# Patient Record
Sex: FEMALE | Race: BLACK OR AFRICAN AMERICAN | NOT HISPANIC OR LATINO | Employment: UNEMPLOYED | URBAN - METROPOLITAN AREA
[De-identification: names, ages, dates, MRNs, and addresses within clinical notes are randomized per-mention and may not be internally consistent; named-entity substitution may affect disease eponyms.]

---

## 2018-01-11 NOTE — PROGRESS NOTES
MAR 30 2016         RE: Vicente Cavanaugh                               To: Negrito SHAWNA Cordova    MR#: [id number]                                    [address]   : [address] [facility],  O  [place] [address]   #: *********                                    Fax: [phone]   ENC:   (Exam #: [id number])      The LMP of this 30year old,  3, para 2 patient was 2015,   giving her an ROMERO of AUG 18 2016 and a current gestational age of 24 weeks   6 days by dates  A sonographic examination was performed on MAR 30 2016   using real time equipment  The ultrasound examination was performed using   abdominal & vaginal techniques  The patient has a BMI of 26 4  Her blood   pressure today was 103/65        Earliest ultrasound found in her record: 2016   9w4d  2016 ROMERO      Cardiac motion was observed at 144 bpm       INDICATIONS      long  interval pregnancy   previous  delivery   fetal anatomical survey      Exam Types      LEVEL II   Transvaginal      RESULTS      Fetus # 1 of 1   Vertex presentation   Fetal growth appeared normal   Placenta Location = Posterior, low lying   No placenta previa   Placenta Grade = I      MEASUREMENTS (* Included In Average GA)      AC              15 9 cm        20 weeks 6 days* (67%)   BPD              5 1 cm        21 weeks 2 days* (86%)   HC              18 0 cm        20 weeks 2 days* (59%)   Femur            3 2 cm        20 weeks 1 day * (46%)      Nuchal Fold      3 9 mm      Humerus          3 2 cm        20 weeks 4 days  (67%)   Radius           2 7 cm        21 weeks 1 day   Ulna             2 9 cm        20 weeks 6 days   Tibia            2 8 cm        20 weeks 2 days  (57%)   Fibula           2 9 cm        19 weeks 6 days   Foot             3 6 cm        21 weeks 1 day      Cerebellum       2 0 cm        20 weeks 1 day   Biorbit          3 1 cm        20 weeks 1 day   CisternaMagna    3 9 mm HC/AC           1 13   FL/AC           0 20   FL/BPD          0 64   EFW (Ac/Fl/Hc)   360 grams - 0 lbs 13 oz      THE AVERAGE GESTATIONAL AGE is 20 weeks 5 days +/- 10 days  AMNIOTIC FLUID      Largest Vertical Pocket = 4 2 cm   Amniotic Fluid: Normal      CERVICAL EVALUATION           Supine               Cervical Length: 4 20 cm        Other Test Results         Resp To T F  Pressure: No                    Funneling?: No              Dynamic Changes?: No      ANATOMY      Head                                    Normal   Face/Neck                               Normal   Th  Cav  Normal   Heart                                   Normal   Abd  Cav  Normal   Stomach                                 Normal   Right Kidney                            Normal   Left Kidney                             Normal   Bladder                                 Normal   Abd  Wall                               Normal   Spine                                   Normal   Extrems                                 Normal   Genitalia                               Normal   Placenta                                Normal   Umbl  Cord                              Normal   Uterus                                  Normal      ANATOMY DETAILS      Visualized Appearing Sonographically Normal:   HEAD: (Calvarium, BPD Level, Lateral Ventricles, Choroid Plexus,   Cerebellum, Cisterna Magna);    FACE/NECK: (Neck, Nuchal Fold, Profile,   Orbits, Nose/Lips, Palate, Face);    TH  CAV : (Diaphragm); HEART:   (Four Chamber View, Proximal Left Outflow, Proximal Right Outflow, Aortic   Arch, Ductal Arch, Short Axis of Greater Vessels, Cardiac Axis,   Interventricular Septum, Interatrial Septum, IVC, SVC, Cardiac Position);      ABD  CAV , STOMACH, RIGHT KIDNEY, LEFT KIDNEY, BLADDER, ABD   WALL,   SPINE: (Cervical Spine, Thoracic Spine, Lumbar Spine, Sacrum);    EXTREMS:   (Lt Humerus, Rt Humerus, Lt Forearm, Rt Forearm, Lt Hand, Rt Hand, Lt   Femur, Rt Femur, Lt Low Leg, Rt Low Leg, Lt Foot, Rt Foot);    GENITALIA,   PLACENTA, UMBL  CORD, UTERUS      ANATOMY COMMENTS      No fetal structural abnormality or ultrasound marker for aneuploidy is   identified on the Level II ultrasound study today  The genitalia were   reviewed and found to be male      The patient is  aware of the results of   the fetal sex  Fetal growth and amniotic fluid volume appear normal     Active movement of the fetal body & extremities was seen  There is no   suspicion of a subchorionic bleed  The placental cord insertion was   normal    The cervix seen by transvaginal scan appears normal measuring   4 2 cms  There is no evidence for spontaneous funneling of the cervix seen  ADNEXA      The left ovary appeared normal and measured 2 8 x 2 5 x 2 0 cm with a   volume of 7 3 cc  The right ovary appeared normal and measured 2 6 x 2 6 x   2 6 cm with a volume of 9 2 cc  IMPRESSION      Berrios IUP   20 weeks and 5 days by this ultrasound  (ROMERO=AUG 12 2016)   Vertex presentation   Fetal growth appeared normal   Normal anatomy survey   Regular fetal heart rate of 144 bpm   Posterior, low lying placenta   No placenta previa      GENERAL COMMENT         I had the pleasure of seeing Marco Barbara in the  Center for   her level II ultrasound  She has a history of a prior  delivery  There was also been a long interpregnancy interval  She did have an   elevated inhibin value on her sequential screen but the other for hormones   were normal  She denies any pregnancy complications  Today's ultrasound was reassuring  A viable fetus was seen  The placenta   is posterior and low-lying as it does come to within 1 2 cm of the   internal os  Amniotic fluid appeared normal  Fetal growth appeared very   appropriate and correlated well with her due date  There were no obvious   anomalies seen today   The anatomic survey was completed today  There were   no Down syndrome markers seen  Both maternal ovaries were seen and   appeared normal  There were no obvious subchorionic hematomas or uterine   myomas  The uterine artery Doppler flow studies were normal  Her cervix   was seen transvaginally and appeared normal in length with no evidence of   funneling or dynamic change  The placental cord insertion were seen and   was normal in location  The patient appeared well-nourished, well-developed, and in no apparent   distress  Her uterus is nontender  Her abdomen was gravid and nontender  The anatomic survey was completed today and there were no obvious anatomic   abnormalities  We discussed kick counts in the office today  We discussed the 10 kicks in   2 hour rule  I asked her to call your office if criteria are not met  She   should continue to do her kick counts on a daily basis  Kick counts should   begin at 28 weeks  MISSED ANATOMY: None      NEW FINDINGS ON TODAY'S ULTRASOUND: Posterior low-lying placenta  IN SUMMARY: Today's ultrasound was very reassuring for Emerald  The fetus   appears to be growing well as today's ultrasound correlated well with her   due date  There were no markers for Down syndrome  There were no fetal   anomalies and the anatomic survey was completed  Her cervix was long and   closed  The amniotic fluid also appeared normal  Thus today's ultrasound   was reassuring  Given the elevated inhibin value and her posterior   low-lying placenta, I did recommend to followup growth scans, one at 28   weeks, and one at 34 weeks  Overall today's ultrasound was still very   reassuring  Total face-to-face time with the patient, excluding ultrasound time was 15   minutes with more than 50% of the time devoted to counseling and   coordination of care  Thank you very much for allowing me to participate in the care of your   patient   If you have any questions or concerns about today's visit, please   do not hesitate to call me  Sincerely,      SHAWNA Galvez  Maternal Fetal Medicine      TWYLA Vazquez M D     Maternal-Fetal Medicine   Electronically signed 03/30/16 11:50

## 2018-01-13 NOTE — PROGRESS NOTES
2016         RE: Rafal Falk                               To: SHAWNA Rachel    MR#: [id number]                                   [address]   : [place]  [address]   #: *********                                    Fax: [facility]: [phone]   (Exam #: [id number])      The LMP of this 30year old,  3, para 2 patient was 2015,   giving her an ROMERO of AUG 18 2016 and a current gestational age of 11 weeks   6 days by dates  A sonographic examination was performed on 2016   using real time equipment  The ultrasound examination was performed using   abdominal technique  The patient has a BMI of 24 6  Her blood pressure   today was 111/73  Earliest ultrasound found in her record: 2016   9w4d  2016 ROMERO      Thank you very much for your kind referral of Rafal Falk to the   [facility]  at [facility] on 2016 for first   trimester ultrasound evaluation and  assessment  Fredy Rondon is a   13-year-old black female  3 para 5 who is currently at 8-6/7   weeks gestation by an estimated today 2016  With the exception   of occasional nausea, treated with Zofran, she has no complaints  She   denies vaginal bleeding so far  Her early prenatal course has been   unremarkable      Emerald has a past OB history significant for an initial pregnancy   delivered at term vaginally in  following an uncomplicated prenatal   course  She delivered a 7 lbs  6 oz  baby girl  By history, her only other   prior pregnancy was delivered at about 36 weeks gestation in    following spontaneous  labor at [facility]  Her baby daughter   had a birthweight of 4 pounds 6 ounces, and had a several week    intensive care unit stay  Each of her children is currently healthy        Emerald has a history of depression, currently not treated medically  Her   past medical and surgical histories are otherwise unremarkable  She takes   no medication with the exception of a prenatal vitamin on a daily basis   and has no known drug allergy  She denies tobacco, alcohol, or illicit   drug use during the pregnancy  Her sister has diabetes  Her mom has   hypertension  The family medical history is otherwise negative with   respect to first degree relatives with diabetes, hypertension, or venous   thromboembolism  There is no family history of sickle cell disease  The   family genetic history is negative with respect to genetic abnormalities,   birth defects, or mental retardation  Multiple longitudinal and transverse sections revealed a rivas   intrauterine pregnancy  A normal gestational sac was documented  A normal fetal pole was   visualized  Cardiac motion was observed at 175 bpm  The yolk sac was seen,   measuring 0 25 cm  INDICATIONS      long  interval pregnancy   previous  delivery   pregnancy dating      Exam Types      Level I      RESULTS      Fetus # 1 of 1   Fetal growth appeared normal      MEASUREMENTS (* Included In Average GA)      CRL              2 9 cm        9 weeks 4 days *      THE AVERAGE GESTATIONAL AGE is 9 weeks 4 days +/- 5 days  ANATOMY COMMENTS      Anatomic detail is extremely limited at this gestational age  However, a   discrete fetal pole with cardiac and fetal body motion was documented  Limb buds were documented as well  The yolk sac was clearly seen, and the   gestational sac is normal in appearance and located in the fundus of the   uterus  No gross abnormalities were noted on this examination  Free fluid   is not seen in the posterior cul-de-sac  There is no suspicion of a   subchorionic bleed  ADNEXA      The left ovary appeared normal and measured 2 4 x 2 1 x 2 0 cm with a   volume of 5 3 cc   The right ovary appeared normal and measured 2 0 x 1 5 x   1 5 cm with a volume of 2 4 cc  IMPRESSION      Berrios IUP   9 weeks and 4 days by this ultrasound  (ROMERO=AUG 13 2016)   Fetal growth appeared normal   Regular fetal heart rate of 175 bpm      GENERAL COMMENT      Today's ultrasound findings and suggested follow-up were discussed in   detail with Emerald  [facility] will [facility]  at [facility] in about 4 weeks for follow up MFM evaluation, including   the first trimester component of the Stepwise Sequential Screen, which was   discussed at the visit today  Level II ultrasound evaluation will be   performed at about 20 weeks gestation  Serial fetal growth evaluations are   recommended during the second half of the pregnancy given her history of a   prior growth restricted baby  I recommended treatment with weekly IM 17-OHPC, 250 mg, beginning at 16   weeks gestation, which will decrease her risk for recurrence of   spontaneous  birth by about 30%  Medical records should be obtained   from the [facility] in order to verify the events surrounding her    delivery  Continuation of weekly 17-OHPC is recommended through 36   completed weeks gestation  Cervical sonography will be performed at the   level II ultrasound study  Consideration should also be given for   treatment with 81 mg of aspirin a day beginning at 12 weeks gestation,   which will significantly reduce Emerald's chances for recurrence of fetal   growth restriction  Her long interpregnancy interval is associated with an   increased risk for adverse pregnancy outcomes, including preeclampsia,    section, and fetal growth abnormalities  Clinical awareness   should be maintained in this regard  The face to face time, in addition to time spent discussing ultrasound   results, was 15  minutes, greater than 50% of which was spent during   counseling and coordination of care  TWYLA Jara M D     Maternal-Fetal Medicine Electronically signed 01/13/16 14:34

## 2018-01-15 NOTE — PROGRESS NOTES
MAY 25 2016         RE: Ania Osman                               To: SHAWNA Mcduffie    MR#: [id number]                                   [address]   : [address] [facility]  [address]   SS#: *********                                    Fax: [employer]: [phone]   (Exam #: [id number])      The LMP of this 30year old,  G3, P1-1-0-2 patient was 2015, giving   her an ROMERO of AUG 18 2016 and a current gestational age of 32 weeks 6 days   by dates  A sonographic examination was performed on MAY 25 2016 using   real time equipment  The ultrasound examination was performed using   abdominal & vaginal techniques  The patient has a BMI of 27 3  Her blood   pressure today was 107/66  Earliest ultrasound found in her record: 2016   9w4d  2016 ROMERO      Cardiac motion was observed at 153 bpm       INDICATIONS      long  interval pregnancy   previous  delivery   Interval growth assesment   placental location      Exam Types      Level I   Transvaginal      RESULTS      Fetus # 1 of 1   Vertex presentation   Fetal growth appeared normal   Placenta Location = Posterior   No placenta previa   Placenta Grade = II      AMNIOTIC FLUID      Q1: 4 5      Q2: 2 6      Q3: 4 8      Q4: 4 2   DERICK Total = 16 1 cm   Amniotic Fluid: Normal      MEASUREMENTS (* Included In Average GA)      AC              24 0 cm        28 weeks 2 days* (52%)   BPD              7 3 cm        29 weeks 2 days* (67%)   HC              26 8 cm        28 weeks 6 days* (56%)   Femur            5 1 cm        27 weeks 3 days* (30%)      Cerebellum       3 3 cm        29 weeks 4 days      HC/AC           1 11   FL/AC           0 21   FL/BPD          0 70   EFW (Ac/Fl/Hc)  1179 grams - 2 lbs 10 oz                 (50%)      THE AVERAGE GESTATIONAL AGE is 28 weeks 4 days +/- 18 days        CERVICAL EVALUATION      The cervix appeared normal (Ultrasound Examination)  SUPINE      Cervical Length: 4 10 cm      OTHER TEST RESULTS           Funneling?: No             Dynamic Changes?: No        Resp  To TFP?: No      ANATOMY      Head                                    Normal   Th  Cav  Normal   Heart                                   See Details   Abd  Cav  Normal   Stomach                                 Normal   Right Kidney                            Normal   Left Kidney                             Normal   Bladder                                 Normal   Placenta                                Normal      ANATOMY COMMENTS      Fetal anatomy has been previously documented; no anomalies were   identified  No fetal structural abnormality is identified on the Level I   survey today  Follow up anatomy of the lateral ventricles, 4 chamber view,   outflow tracts, diaphragm,  kidneys, stomach and bladder appear normal    Fetal interval growth and amniotic fluid volume are normal       IMPRESSION      Berrios IUP   28 weeks and 4 days by this ultrasound  (ROMERO=AUG 13 2016)   Vertex presentation   Fetal growth appeared normal   Regular fetal heart rate of 153 bpm   Posterior placenta   No placenta previa      GENERAL COMMENT         I had the pleasure of seeing  Marco Goodell in the [facility]    today for followup growth scan  She reports normal daily fetal movements  She denies any vaginal bleeding, leakage of fluid, or significant   contractions or pelvic pressure  There has been no major pregnancy   complications since her last visit here in the  Center  She has a   history of a 36 week spontaneous  birth along with a prior growth   restricted fetus  She did have an elevated inhibin on her sequential   screen and has a long 8 year intra-pregnancy interval          Also on her prior ultrasound the placenta was still close to the internal   os   Today the placenta is at least 2 9 cm away from the internal os and   thus she is cleared for a vaginal delivery  On today's ultrasound, the fetus was in a vertex presentation  The   amniotic fluid appeared very normal today  Fetal growth was within normal   range  The interval anatomy seen today showed no obvious anomalies  Her   cervix today was long and closed as it measured 41 mm with no funneling or   dynamic change  We discussed the importance of initiating fetal kick counting at least   once daily  We discussed the "10 kicks in 2 hour rule"  I instructed her   to report to you immediately should criteria not be met  Kick counts   should begin at 28 weeks gestation  IMPORTANT  FINDINGS ON TODAY'S ULTRASOUND: No longer a low-lying placenta  The fetus is growing well with normal fluid in the vertex presentation         IN SUMMARY:  Today's ultrasound was reassuring as the fetus is growing   well with normal amniotic fluid  The fetus was in a vertex presentation  The placenta is at least 2 9 cm away from her internal os and thus she is   cleared to deliver vaginally  She should continue to do her kick counts on   a daily basis  A fetal growth evaluation is recommended in 6 weeks and   this was scheduled today  Face-to-face time, in addition to time spent discussing ultrasound results   was 10 minutes, with greater than 50% of the time used for counseling and   coordination of care  A description of the counseling/coordination of care   is described above  Thank you very much for allowing us to participate in the care of your   patient  If you have any questions or concerns about today's visit please   do not hesitate to call me  Thank you very much  Amada ROSENTHAL  Maternal Fetal Medicine      Burnie Mcburney, R D M S Alois Slack, M D     Maternal-Fetal Medicine   Electronically signed 05/25/16 10:53

## 2018-01-15 NOTE — PROGRESS NOTES
AUG 10 2016         RE: Kimberly Fierro                               To: Scarlet Canales, M D    MR#: [id number]                                   [address]   : [address] [facility],  [address]   #: *********                                    Fax: [address] [place], [place] [address]vard: [phone]   (Exam #: [id number])      The LMP of this 27year old,  G3, P1-1-0-2 patient was 2015, giving   her an ROMERO of AUG 18 2016 and a current gestational age of 37 weeks 6 days   by dates  A sonographic examination was performed on AUG 10 2016 using   real time equipment  The ultrasound examination was performed using   abdominal technique  The patient has a BMI of 28 5  Her blood pressure   today was 135/81  Earliest ultrasound found in her record: 2016   9w4d  2016 ROMERO      Problem list   1  Advanced maternal age with normal cell free DNA screening   2  New-onset polyhydramnios found at 37 weeks   3  History of a 36 week  delivery      Cardiac motion was observed at 126 bpm       INDICATIONS      polyhydramnios      Exam Types      Biophysical Profile   amniotic fluid evaluation      RESULTS      Fetus # 1 of 1   Vertex presentation   Placenta Location = Right lateral   No placenta previa   Placenta Grade = III      AMNIOTIC FLUID      Q1: 5 0      Q2: 8 2      Q3: 8 3      Q4: 5 5   DERICK Total = 27 0 cm   Amniotic Fluid: POLYHYDRAMNIOS      BIOPHYSICAL PROFILE      The Biophysical Profile score was 8/8  Breathin  Movement: 2  Tone: 2  AFV: 2      IMPRESSION      Berrios IUP   Vertex presentation   Regular fetal heart rate of 126 bpm   Polyhydramnios   Right lateral placenta   No placenta previa      GENERAL COMMENT         Follow-up fetal surveillance includes once weekly BPP's along with daily   kick counts till delivery  TWYLA Gaviria M D     Maternal-Fetal Medicine   Electronically signed 08/10/16 18:40

## 2018-01-15 NOTE — PROGRESS NOTES
FEB 10 2016         RE: Favian Su                               To: SHAWNA Evans    MR#: [id number]                                   [address]   : LON [place]rickson  [address]   SS#: *********                                    Fax: [phone]   ENC:   (Exam #: [id number])      The LMP of this 30year old,  3, para 2 patient was 2015,   giving her an ROMERO of AUG 18 2016 and a current gestational age of 16 weeks   6 days by dates  A sonographic examination was performed on FEB 10 2016   using real time equipment  The patient has a BMI of 24 3  Her blood   pressure today was 105/68  Earliest ultrasound found in her record: 2016   9w4d  2016 ROMERO   Multiple longitudinal and transverse sections revealed a rivas   intrauterine pregnancy with the fetus in variable presentation  The   placenta is posterior in implantation, grade 0 in appearance  Cardiac motion was observed at 150 bpm       INDICATIONS      long  interval pregnancy   previous  delivery   first trimester genetic screening      Exam Types      Level I   sequential screen      RESULTS      Fetus # 1 of 1   Variable presentation   Fetal growth appeared normal      MEASUREMENTS (* Included In Average GA)      CRL              7 0 cm        13 weeks 0 days*   Nuchal Trans    1 80 mm      THE AVERAGE GESTATIONAL AGE is 13 weeks 0 days +/- 7 days  UTERINE ARTERIES                                  S/D   PI    RI    NOTCH       Left Uterine Artery        2 26  0 92  0 56       Right Uterine Artery             1 14      ANATOMY COMMENTS      Anatomic detail is limited at this gestational age  The yolk sac was not   noted  The fetal cranium appeared normal in shape and the nuchal   translucency was normal in size (1 8mm)  The nasal bone appears to be   present  The intracranial anatomy was unremarkable    Evaluation of the   spine revealed no obvious evidence for a neural tube defect  Anatomy of   the fetal thorax appeared within normal limits  The cardiac rhythm was   regular  Within the abdomen, stomach & bladder were visualized and the   abdominal wall appeared intact  A three vessel cord appears to be present  Active movement of the fetal body & extremities was seen  There is no   suspicion of a subchorionic bleed  The placental cord insertion was   normal    The uterine artery Dopplers are normal for this gestational age  There is no suspicion of a uterine myoma  Free fluid is not seen in the   posterior cul-de-sac  ADNEXA      The left ovary appeared normal and measured 2 7 x 2 4 x 2 6 cm with a   volume of 8 8 cc  The right ovary was not visualized  AMNIOTIC FLUID      Largest Vertical Pocket = 5 3 cm   Amniotic Fluid: Normal      IMPRESSION      Berrios IUP   13 weeks and 0 days by this ultrasound  (ROMERO=AUG 17 2016)   Variable presentation   Fetal growth appeared normal   Regular fetal heart rate of 150 bpm   Posterior placenta      GENERAL COMMENT      We discussed the options for genetic screening, including but not limited   to first trimester screening, second trimester screening, combined first   and second trimester screening, noninvasive prenatal testing (NIPT) for   patients at high risk and diagnostic screening through the use of CVS and   amniocentesis  We discussed the risks and benefits of each approach   including the sensitivities and false positive rates as well as the   difference between a screening test and a diagnostic test   At the   conclusion of our discussion the patient elected Stepwise Sequential   Screening to delineate her risk for fetal aneuploidy  She was given a   requisition to go to [facility] to have the first trimester bloodwood drawn      The first trimester portion of the screening results, encompassing age,   nuchal translucency, and biochemistry should be available within one week   of testing and will be reported from [facility]   The second stage of   sequential screening should be completed between the 15th and 21st week of   pregnancy (ideally between 16-18 weeks)  Recommend an anatomy ultrasound be scheduled for 20 weeks gestation  Please note, in addition to the time spent discussing the results of the   ultrasound, I spent approximately 10 minutes of face-to-face time with the   patient, greater than 50% of which was spent in counseling and the   coordination of care for this patient  Thank you very much for allowing us to participate in the care of this   very nice patient  Should you have any questions, please do not hesitate   to contact our office  TWYLA Macias M D     Electronically signed 02/10/16 11:42

## 2018-01-15 NOTE — RESULT NOTES
Message   Please call patient with reassuring results  Thank you  Verified Results  (Q) STEPWISE, PART 1 [id number] 11:40AM Sanford Echavarria     Test Name Result Flag Reference   INTERPRETATION SEE NOTE     This patient's risk does not exceed the first trimester  cut-off for Down syndrome or trisomy 18  The integrated  screen calculation is awaiting the second trimester sample  NT WAS USED IN THE RISK CALCULATIONS  Thank you for submitting this patient's Part 1 specimen  These first trimester values will be incorporated with the  second trimester values as part of the integrated testing  process  Please submit the Part 2 specimen between   02/24/2016-04/19/2016 (15 0 and 22 9 weeks gestation) with   02/24/2016-03/08/2016 (15 0 - 16 9 weeks gestation) being  optimal  When submitting Part 2, please include the  following Specimen # from Part 1:  [id number]   AGE RISK DOWN SYNDROME 1:670     LEANNE DOWN SYNDROME RISK <1:5000  <1:50   RISK FOR TRISOMY 18 <1:5000  <1:100   CALCULATED GESTATIONAL$AGE 13 0     Crown rump length (CRL) was used to calculate gestational  age  ROMERO, if provided, was not used for gestational age  dating  PARUL-A 1580 ng/mL     PARUL-A MOM 1 25     HCG 72 8 IU/mL     HCG MOM 0 80     NT MOM 1 16     The maternal serum screening results indicate a lower risk  of trisomy 21 in this pregnancy  The nasal bone was assessed  via ultrasound and was present  The combined risk is  therefore likely to be less than the calculated risk  Other  findings later in the pregnancy may change the risk  Nasal bone assessment is best accomplished through a fetal  ultrasound performed between 11 weeks 0 days through 13  weeks 6 days   In assessing the risk for aneuploidy, the  evaluation of the maternal serum markers plus the nuchal  thickness measurement is calculated first  Any potential  change to the patient's risk for aneuploidy depends on the  nuchal thickness, crown-rump length, and the ethnic origin,  and therefore the values generated by the algorithm itself  will not change  Additional information about the assessment  of the fetal nasal bone may be found on the [place] [address] website at  http://www  fetalmedicine com/fmf/training-certification/cert  khdmhvse-au-vwmyw  tence/11-13-week-scan/assessment-of-the-nasal-bone/  Please note that the Sequential Integrated maternal serum  screen for Down syndrome risk assessment was designed by Dr Emiliano Vallejo ([address], et al J Med Screen 2003 v10 p56-104)  to provide a high detection rate and low false positive rate  when a cutoff of 1:50 is used to identify high risk  pregnancies during the first trimester  Use of any other  cutoff for determination of risk in the first trimester will  result in a higher false positive rate for the two-part  screen  All patients whose risk is lower than 1:50 should  have a second sample submitted to complete the screen  This is a screening test, not a diagnostic test      This risk assessment is based on demographic data provided  by the ordering physician  Please notify the laboratory  promptly if any data are incorrect  If you have questions concerning this report: For clinical consultation, call [phone]; For technical questions, call [phone] ext [phone]; For recalculations, fax to [phone]  This test was developed and its analytical performance  characteristics have been determined by [facility]  It has not been  cleared or approved by FDA  This assay has been validated  pursuant to the CLIA regulations and is used for clinical  purposes  For additional information, please refer to  http://education  LayerGl[employer]/faq/FAQ85  (This link is provided for informational/educational  purposes only )   REFERRING PHYSICIAN NAME [address] [place] [address] PHONE [phone]     [address] OGLESBY [id number] DATE OF BIRTH 1990     COLLECTION DATE 02/10/2016     MATERNAL WEIGHT 137 lbs     EST'D DATE OF DELIVERY 08/18/2016     ROMERO DETERMINED BY ULTRASOUND     MOTHER'S ETHNIC ORIGIN      NUMBER OF FETUSES 1     INSULIN-DEPEND DIABETIC NO     REPEAT SPECIMEN NO     HX OF NEURAL TUBE DEFECTS NO     HISTORY OF DOWN SYNDROME NO     DONOR EGG NO     Donor Age: Egg Retrieval NOT GIVEN     ULTRASOUND DATE 02/10/2016     ULTRASONOGRAPHER'S NAME MARLENE GLORIAwest Communications     NTQR ULTRASONOGRAPHER ID# [id number]     NTQR LOCATION ID# NOT GIVEN     NTQR READING PHYS ID# [id number]     F ULTRASONOGRAPHER ID# NOT GIVEN     CROWN RUMP LENGTH 70 mm     NUCHAL TRANSLUCENCY 1 8 mm     Nasal Bone PRESENT     IF TWINS NOT GIVEN     TWIN B CRL NOT GIVEN mm     TWIN B NT NOT GIVEN mm     Twin B Nasal Bone NOT GIVEN       SPECIMEN ID NOTIFICATION MISSING SECOND ID [id number] 11:40AM Maria Luisa Deep     Test Name Result Flag Reference   COMMENT: See Below     Only one form of patient ID was present on one or more   sample(s); two forms of patient ID are required by the   College of [employer] (CAP)

## 2018-01-17 NOTE — PROGRESS NOTES
2016         RE: Romeo Collado                               To: SHAWNA Mullins    MR#: [id number]                                   [address]   : [address], [address]   SS#: *********                                    Fax: Loyd Leachh: [phone]   (Exam #: [id number])      The LMP of this 30year old,  G3, P1-1-0-2 patient was 2015, giving   her an ROMERO of AUG 18 2016 and a current gestational age of 29 weeks 6 days   by dates  A sonographic examination was performed on 2016 using real   time equipment  The ultrasound examination was performed using abdominal   technique  The patient has a BMI of 27 8  Her blood pressure today was   118/75  Earliest ultrasound found in her record: 2016   9w4d  2016 ROMERO      Cardiac motion was observed at 128 bpm       INDICATIONS      long  interval pregnancy   previous  delivery   Interval growth assesment      Exam Types      Level I      RESULTS      Fetus # 1 of 1   Vertex presentation   Placenta Location = Posterior   No placenta previa   Placenta Grade = I      MEASUREMENTS (* Included In Average GA)      AC              29 8 cm        33 weeks 6 days* (48%)   BPD              8 5 cm        34 weeks 1 day * (50%)   HC              31 2 cm        34 weeks 4 days* (45%)   Femur            6 4 cm        33 weeks 0 days* (39%)      Humerus          5 9 cm        34 weeks 0 days  (57%)      Cerebellum       4 3 cm        34 weeks 6 days      HC/AC           1 05   FL/AC           0 22   FL/BPD          0 76   EFW (Ac/Fl/Hc)  2273 grams - 5 lbs 0 oz                 (41%)      THE AVERAGE GESTATIONAL AGE is 33 weeks 6 days +/- 21 days        AMNIOTIC FLUID      Q1: 5 1      Q2: 3 5      Q3: 5 4      Q4: 6 1   DERICK Total = 20 2 cm   Amniotic Fluid: Normal      ANATOMY      Head                                    Normal   Heart Normal   Stomach                                 Normal   Right Kidney                            Normal   Left Kidney                             Normal   Bladder                                 Normal   Genitalia                               Normal   Placenta                                Normal      ANATOMY DETAILS      Visualized Appearing Sonographically Normal:   HEAD: (Calvarium, BPD Level, Cavum, Lateral Ventricles, Cerebellum); HEART: (Four Chamber View, Proximal Left Outflow, Proximal Right Outflow,   3 Vessel Trachea, Cardiac Axis, Cardiac Position);    STOMACH, RIGHT   KIDNEY, LEFT KIDNEY, BLADDER, GENITALIA, PLACENTA      ANATOMY COMMENTS      Fetal anatomy has been previously documented; no anomalies were   identified  No fetal structural abnormality is identified on the Level I   survey today  Follow up anatomy of the lateral ventricles, 4 chamber view,   outflow tracts, diaphragm,  kidneys, stomach and bladder appear normal    Fetal interval growth and amniotic fluid volume are normal       BIOPHYSICAL PROFILE      The Biophysical Profile score was 8/8  Breathin  Movement: 2  Tone: 2  AFV: 2      IMPRESSION      Berrios IUP   33 weeks and 6 days by this ultrasound  (ROMERO=AUG 18 2016)   Vertex presentation   Regular fetal heart rate of 128 bpm   Posterior placenta   No placenta previa      GENERAL COMMENT      I had the [facility] growth [facility]  She reports normal daily fetal movements  She denies any vaginal bleeding, leakage of fluid, or significant   contractions or pelvic pressure  There has been no major pregnancy   complications since her last visit here in the  Center  She has a   history of a prior  birth  There has also been a long   interpregnancy interval       On today's ultrasound, the fetus was in a vertex presentation  The   amniotic fluid appeared very normal today   Fetal growth was within normal   range  Fetal breathing was seen today as well  The interval anatomy   appeared very appropriate with no obvious anomalies seen today  We discussed the importance of initiating fetal kick counting at least   once daily  We discussed the "10 kicks in 2 hour rule"  I instructed her   to report to you immediately should criteria not be met  Kick counts   should begin at 28 weeks gestation  Thus today's ultrasound was reassuring  The fetus is growing well with   normal fluid, and in a vertex presentation  No further ultrasounds appear   indicated at this time  Face-to-face time, in addition to time spent discussing ultrasound results   was 10 minutes, with greater than 50% of the time used for counseling and   coordination of care  Misty Hofmeister, R D M S Rhoderick Bamberger, M D     Maternal-Fetal Medicine   Electronically signed 07/06/16 10:33

## 2018-01-17 NOTE — PROGRESS NOTES
AUG 3 2016         RE: Abhishek Negrete                               To: SHAWNA Mccord    MR#: [id number]                                   [address]   : [place]  [address]   SS#: *********                                    Fax: [phone]: [phone]   (Exam #: [id number])      The LMP of this 27year old,  G3, P1-1-0-2 patient was 2015, giving   her an ROMERO of AUG 18 2016 and a current gestational age of 42 weeks 6 days   by dates  A sonographic examination was performed on AUG 3 2016 using real   time equipment  The ultrasound examination was performed using abdominal   technique  The patient has a BMI of 29 2  Her blood pressure today was   124/82  Earliest ultrasound found in her record: 2016   9w4d  2016 ROMERO      Emerald has no complaints today  She reports regular fetal movements and   denies problems related to hypertension, gestational diabetes,    labor, or vaginal bleeding  Cardiac motion was observed at 130 bpm       INDICATIONS      long  interval pregnancy   previous  delivery   Interval growth assesment      Exam Types      Level I   Biophysical Profile      RESULTS      Fetus # 1 of 1   Vertex presentation   Fetal growth appeared normal   Placenta Location = Posterior   No placenta previa   Placenta Grade = III      MEASUREMENTS (* Included In Average GA)      AC              34 4 cm        38 weeks 4 days* (65%)   BPD              8 3 cm        33 weeks 3 days* (<5%)   HC              31 6 cm        35 weeks 0 days* (<5%)   Femur            6 9 cm        35 weeks 0 days* (14%)      HC/AC           0 92   FL/AC           0 20   FL/BPD          0 83   Ceph Index      0 74   EFW (Ac/Fl/Hc)  3071 grams - 6 lbs 12 oz                 (38%)      THE AVERAGE GESTATIONAL AGE is 35 weeks 4 days +/- 21 days        AMNIOTIC FLUID      Q1: 3 2      Q2: 10 6     Q3: 5 4 Q4: 5 4   DERICK Total = 24 5 cm   Amniotic Fluid: POLYHYDRAMNIOS      ANATOMY DETAILS      Visualized Appearing Sonographically Normal:   HEAD: (Calvarium, BPD Level); HEART: (Four myhomemove, Cardiac Axis);      ABD  CAV , STOMACH, RIGHT KIDNEY, LEFT KIDNEY, BLADDER      BIOPHYSICAL PROFILE      The Biophysical Profile score was 8/8  Breathin  Movement: 2  Tone: 2  AFV: 2      IMPRESSION      Berrios IUP   35 weeks and 4 days by this ultrasound  (ROMERO=SEP 3 2016)   Vertex presentation   Fetal growth appeared normal   Regular fetal heart rate of 130 bpm   Polyhydramnios   Posterior placenta   No placenta previa      GENERAL COMMENT      No fetal structural abnormality is identified on the Level I survey today  Fetal interval growth is normal  Mild polyhydramnios is present  The   biophysical profile is normal       Today's ultrasound findings and suggested follow-up were discussed in   detail with Emerald  Daily third trimester fetal kick counting was   discussed at the visit today  She will return to the [address] [place] [address] in one week for a biophysical profile for the   indication of polyhydramnios  Follow-up [facility] ultrasound evaluation is   recommended as clinically indicated  The face to face time, in addition to time spent discussing ultrasound   results, was 10 minutes, greater than 50% of which was spent during   counseling and coordination of care  TWYLA Shetty M D     Maternal-Fetal Medicine   Electronically signed 16 11:09

## 2018-07-30 ENCOUNTER — TELEPHONE (OUTPATIENT)
Dept: OBGYN CLINIC | Facility: CLINIC | Age: 28
End: 2018-07-30

## 2019-03-25 ENCOUNTER — TELEPHONE (OUTPATIENT)
Dept: OBGYN CLINIC | Facility: CLINIC | Age: 29
End: 2019-03-25

## 2019-03-28 ENCOUNTER — OFFICE VISIT (OUTPATIENT)
Dept: FAMILY MEDICINE CLINIC | Facility: CLINIC | Age: 29
End: 2019-03-28

## 2019-03-28 VITALS
OXYGEN SATURATION: 98 % | HEART RATE: 70 BPM | HEIGHT: 63 IN | RESPIRATION RATE: 18 BRPM | TEMPERATURE: 97.8 F | DIASTOLIC BLOOD PRESSURE: 74 MMHG | BODY MASS INDEX: 24.63 KG/M2 | SYSTOLIC BLOOD PRESSURE: 118 MMHG | WEIGHT: 139 LBS

## 2019-03-28 DIAGNOSIS — Z02.4 ENCOUNTER FOR DRIVER'S LICENSE HISTORY AND PHYSICAL: Primary | ICD-10-CM

## 2019-03-28 PROCEDURE — 99213 OFFICE O/P EST LOW 20 MIN: CPT | Performed by: FAMILY MEDICINE

## 2019-03-28 NOTE — PROGRESS NOTES
Assessment/Plan:    Encounter for 's license history and physical  Cervical ROM WNL, no medical conditions that would limit patient from driving, she denies any history of unexplained syncope, seizures or cardiac events  Form filled out in front of patient and given back  20/25 vision bilaterally uncorrected  Form copied and placed to be scanned and filed to her chart  Diagnoses and all orders for this visit:    Encounter for 's license history and physical          Subjective:      Patient ID: Jimmy Rausch is a 25 y o  female  She presents today for a 's license physical        The following portions of the patient's history were reviewed and updated as appropriate: allergies, current medications, past family history, past medical history, past social history, past surgical history and problem list     Review of Systems   Constitutional: Negative for activity change, appetite change, fatigue and fever  HENT: Negative for drooling and sore throat  Eyes: Negative for pain and visual disturbance  Respiratory: Negative for cough, chest tightness and shortness of breath  Cardiovascular: Negative for chest pain and palpitations  Gastrointestinal: Negative for abdominal pain, constipation, diarrhea and nausea  Genitourinary: Negative for dysuria and hematuria  Musculoskeletal: Negative for back pain and myalgias  Skin: Negative for color change  Neurological: Negative for numbness and headaches  Psychiatric/Behavioral: Negative for hallucinations and suicidal ideas  Objective:      /74 (BP Location: Left arm, Patient Position: Sitting, Cuff Size: Standard)   Pulse 70   Temp 97 8 °F (36 6 °C) (Temporal)   Resp 18   Ht 5' 3" (1 6 m)   Wt 63 kg (139 lb)   SpO2 98%   BMI 24 62 kg/m²          Physical Exam   Constitutional: She is oriented to person, place, and time  She appears well-developed and well-nourished     HENT:   Head: Normocephalic and atraumatic  Right Ear: External ear normal    Left Ear: External ear normal    Eyes: Pupils are equal, round, and reactive to light  Neck: Normal range of motion  Neck supple  Cardiovascular: Normal rate, regular rhythm, normal heart sounds and intact distal pulses  Pulmonary/Chest: Effort normal and breath sounds normal  No respiratory distress  Abdominal: Soft  Bowel sounds are normal  She exhibits no distension  There is no tenderness  Musculoskeletal: Normal range of motion  She exhibits no edema  Lymphadenopathy:     She has no cervical adenopathy  Neurological: She is alert and oriented to person, place, and time  Skin: Skin is warm and dry  Psychiatric: She has a normal mood and affect

## 2019-03-29 ENCOUNTER — OFFICE VISIT (OUTPATIENT)
Dept: OBGYN CLINIC | Facility: CLINIC | Age: 29
End: 2019-03-29

## 2019-03-29 VITALS
DIASTOLIC BLOOD PRESSURE: 100 MMHG | SYSTOLIC BLOOD PRESSURE: 140 MMHG | WEIGHT: 132 LBS | HEIGHT: 63 IN | BODY MASS INDEX: 23.39 KG/M2

## 2019-03-29 DIAGNOSIS — B96.89 BACTERIAL VAGINOSIS: Primary | ICD-10-CM

## 2019-03-29 DIAGNOSIS — N76.0 BACTERIAL VAGINOSIS: Primary | ICD-10-CM

## 2019-03-29 LAB — SL AMB POCT WET MOUNT: POSITIVE

## 2019-03-29 PROCEDURE — 99213 OFFICE O/P EST LOW 20 MIN: CPT | Performed by: FAMILY MEDICINE

## 2019-03-29 PROCEDURE — 87491 CHLMYD TRACH DNA AMP PROBE: CPT | Performed by: OBSTETRICS & GYNECOLOGY

## 2019-03-29 PROCEDURE — 87210 SMEAR WET MOUNT SALINE/INK: CPT | Performed by: FAMILY MEDICINE

## 2019-03-29 PROCEDURE — 87591 N.GONORRHOEAE DNA AMP PROB: CPT | Performed by: OBSTETRICS & GYNECOLOGY

## 2019-03-29 RX ORDER — METRONIDAZOLE 7.5 MG/G
5 GEL VAGINAL DAILY
Qty: 70 G | Refills: 0 | Status: SHIPPED | OUTPATIENT
Start: 2019-03-29 | End: 2019-04-02

## 2019-04-01 LAB
C TRACH DNA SPEC QL NAA+PROBE: NEGATIVE
N GONORRHOEA DNA SPEC QL NAA+PROBE: NEGATIVE

## 2019-04-02 ENCOUNTER — TELEPHONE (OUTPATIENT)
Dept: FAMILY MEDICINE CLINIC | Facility: CLINIC | Age: 29
End: 2019-04-02

## 2019-04-02 DIAGNOSIS — N76.0 BV (BACTERIAL VAGINOSIS): Primary | ICD-10-CM

## 2019-04-02 DIAGNOSIS — B96.89 BV (BACTERIAL VAGINOSIS): Primary | ICD-10-CM

## 2019-04-02 RX ORDER — METRONIDAZOLE 7.5 MG/G
1 GEL VAGINAL
Qty: 70 G | Refills: 0 | Status: SHIPPED | OUTPATIENT
Start: 2019-04-02 | End: 2019-04-07

## 2019-07-15 ENCOUNTER — PATIENT OUTREACH (OUTPATIENT)
Dept: OBGYN CLINIC | Facility: CLINIC | Age: 29
End: 2019-07-15

## 2019-07-15 ENCOUNTER — ANNUAL EXAM (OUTPATIENT)
Dept: OBGYN CLINIC | Facility: CLINIC | Age: 29
End: 2019-07-15

## 2019-07-15 VITALS
HEIGHT: 63 IN | DIASTOLIC BLOOD PRESSURE: 70 MMHG | BODY MASS INDEX: 23.85 KG/M2 | SYSTOLIC BLOOD PRESSURE: 120 MMHG | WEIGHT: 134.6 LBS

## 2019-07-15 DIAGNOSIS — Z11.3 SCREEN FOR STD (SEXUALLY TRANSMITTED DISEASE): ICD-10-CM

## 2019-07-15 DIAGNOSIS — Z01.411 ENCOUNTER FOR GYNECOLOGICAL EXAMINATION WITH ABNORMAL FINDING: Primary | ICD-10-CM

## 2019-07-15 DIAGNOSIS — Z30.09 UNWANTED FERTILITY: ICD-10-CM

## 2019-07-15 DIAGNOSIS — B96.89 BV (BACTERIAL VAGINOSIS): ICD-10-CM

## 2019-07-15 DIAGNOSIS — N76.0 BV (BACTERIAL VAGINOSIS): ICD-10-CM

## 2019-07-15 DIAGNOSIS — Z12.4 SCREENING FOR CERVICAL CANCER: ICD-10-CM

## 2019-07-15 DIAGNOSIS — Z30.09 UNWANTED FERTILITY: Primary | ICD-10-CM

## 2019-07-15 DIAGNOSIS — Z13.31 POSITIVE DEPRESSION SCREENING: ICD-10-CM

## 2019-07-15 DIAGNOSIS — N89.8 VAGINAL DISCHARGE: ICD-10-CM

## 2019-07-15 DIAGNOSIS — Z12.39 SCREENING FOR BREAST CANCER: ICD-10-CM

## 2019-07-15 PROBLEM — Z02.4 ENCOUNTER FOR DRIVER'S LICENSE HISTORY AND PHYSICAL: Status: RESOLVED | Noted: 2019-03-28 | Resolved: 2019-07-15

## 2019-07-15 LAB
SL AMB POCT URINE HCG: NEGATIVE
SL AMB POCT WET MOUNT: POSITIVE

## 2019-07-15 PROCEDURE — 87624 HPV HI-RISK TYP POOLED RSLT: CPT | Performed by: NURSE PRACTITIONER

## 2019-07-15 PROCEDURE — G0145 SCR C/V CYTO,THINLAYER,RESCR: HCPCS | Performed by: PATHOLOGY

## 2019-07-15 PROCEDURE — G0124 SCREEN C/V THIN LAYER BY MD: HCPCS | Performed by: PATHOLOGY

## 2019-07-15 PROCEDURE — 87210 SMEAR WET MOUNT SALINE/INK: CPT | Performed by: NURSE PRACTITIONER

## 2019-07-15 PROCEDURE — 87491 CHLMYD TRACH DNA AMP PROBE: CPT | Performed by: NURSE PRACTITIONER

## 2019-07-15 PROCEDURE — 81025 URINE PREGNANCY TEST: CPT | Performed by: NURSE PRACTITIONER

## 2019-07-15 PROCEDURE — 99395 PREV VISIT EST AGE 18-39: CPT | Performed by: NURSE PRACTITIONER

## 2019-07-15 PROCEDURE — 87591 N.GONORRHOEAE DNA AMP PROB: CPT | Performed by: NURSE PRACTITIONER

## 2019-07-15 RX ORDER — METRONIDAZOLE 7.5 MG/G
1 GEL VAGINAL
Qty: 70 G | Refills: 0 | Status: SHIPPED | OUTPATIENT
Start: 2019-07-15 | End: 2020-06-05

## 2019-07-15 RX ORDER — MEDROXYPROGESTERONE ACETATE 150 MG/ML
150 INJECTION, SUSPENSION INTRAMUSCULAR
Qty: 1 ML | Refills: 3 | Status: SHIPPED | OUTPATIENT
Start: 2019-07-15 | End: 2020-06-05

## 2019-07-15 NOTE — LETTER
2019    To Emerald Denis  : 1990      This letter is to advise you that your recent BLOODWORK results were reviewed by me and are NORMAL  Please contact our office for an appointment if you have any additional concerns      JAY Graham

## 2019-07-15 NOTE — PATIENT INSTRUCTIONS
Cigarette Smoking and Your Health     What are the risks to my health if I smoke tobacco?  Nicotine and other chemicals found in tobacco damage every cell in your body  Even if you are a light smoker, you have an increased risk for cancer, heart disease, and lung disease  If you are pregnant or have diabetes, smoking increases your risk for complications  What are the benefits to my health if I stop smoking? · You decrease respiratory symptoms such as coughing, wheezing, and shortness of breath  · You reduce your risk for cancers of the lung, mouth, throat, kidney, bladder, pancreas, stomach, and cervix  If you already have cancer, you increase the benefits of chemotherapy  You also reduce your risk for cancer returning or a second cancer from developing  · You reduce your risk for heart disease, blood clots, heart attack, and stroke  · You reduce your risk for lung infections, and diseases such as pneumonia, asthma, chronic bronchitis, and emphysema  · Your circulation improves  More oxygen can be delivered to your body  If you have diabetes, you lower your risk for complications, such as kidney, artery, and eye diseases  You also lower your risk for nerve damage  Nerve damage can lead to amputations, poor vision, and blindness  · You improve your body's ability to heal and to fight infections  What are the health benefits to others if I stop smoking? Tobacco is harmful to nonsmokers who breathe in your secondhand smoke  The following are ways the health of others around you may improve when you stop smoking:  · You lower the risks for lung cancer and heart disease in nonsmoking adults  · If you are pregnant, you lower the risk for miscarriage, early delivery, low birth weight, and stillbirth  You also lower your baby's risk for SIDS, obesity, developmental delay, and neurobehavioral problems, such as ADHD       · If you have children, you lower their risk for ear infections, colds, pneumonia, bronchitis, and asthma  How to Stop Smoking     You will improve your health and the health of others around you  if you stop smoking  Your risk for heart and lung disease, cancer, stroke, heart attack, and vision problems will also decrease  You can benefit from quitting no matter how long you have smoked  PREPARE to stop smoking  Nicotine is a highly addictive drug found in cigarettes  Withdrawal symptoms can happen when you stop smoking and make it hard to quit  These include anxiety, depression, irritability, trouble sleeping, and increased appetite  You increase your chances of success if you PREPARE to quit  · Set a quit date  Kell Treviño a date that is within the next 2 weeks  Do not pick a day that you think may be stressful or busy  Write down the day or [place] it on your calender  · Tell friends and family that you plan to quit  Explain that you may have withdrawal symptoms when you try to quit  Ask them to support you  They may be able to encourage you and help reduce your stress to make it easier for you to quit  · Make a list of your reasons for quitting  Put the list somewhere you will see it every day, such as your refrigerator  You can look at the list when you have a craving  · Remove all tobacco and nicotine products from your home, car, and workplace  Also, remove anything else that will tempt you to smoke, such as lighters, matches, or ashtrays  Clean your car, home, and places at work that smell like smoke  The smell of smoke can trigger a craving  · Identify triggers that make you want to smoke  This may include activities, feelings, or people  Also write down 1 way you can deal with each of your triggers  For example, if you want to smoke as soon as you wake up, plan another activity during this time, such as exercise  · Make a plan for how you will quit    Learn about the tools that can help you quit, such as medicine, counseling, or nicotine replacement therapy  Choose at least 2 options to help you quit  Tools to help you stop smoking:     · Counseling  from a trained healthcare provider can provide you with support and skills to quit smoking  The provider will also teach you to manage your withdrawal symptoms and cravings  You may receive counseling from one counselor, in group therapy, or through phone therapy called a quit line  · Nicotine replacement therapy (NRT)  such as nicotine patches, gum, or lozenges may help reduce your nicotine cravings  You may get these without a doctor's order  Do not use e-cigarettes or smokeless tobacco in place of cigarettes or to help you quit  They still contain nicotine  · Prescription medicines  such as nasal sprays or nicotine inhalers may help reduce your withdrawal symptoms  Other medicines may also be used to reduce your urge to smoke  Ask your healthcare provider about these medicines  You may need to start certain medicines 2 weeks before your quit date for them to work well  · Hypnosis  is a practice that helps guide you through thoughts and feelings  Hypnosis may help decrease your cravings and make you more willing to quit  · Acupuncture therapy  uses very thin needles to balance energy channels in the body  This is thought to help decrease cravings and symptoms of nicotine withdrawal      · Support groups  let you talk to others who are trying to quit or have already quit  It may be helpful to speak with others about how they quit  Manage your cravings:     · Avoid situations, people, and places that tempt you to smoke  Go to nonsmoking places, such as libraries or restaurants  Understand what tempts you and try to avoid these things  · Keep your hands busy  Hold things such as a stress ball or pen  · Put candy or toothpicks in your mouth  Keep lollipops, sugarless gum, or toothpicks with you at all times  · Do not have alcohol or caffeine    These drinks may tempt you to smoke  Drink healthy liquids such as water or juice instead  · Reward yourself when you resist your cravings  Rewards will motivate you and help you stay positive  · Do an activity that distracts you from your craving  Examples include going for a walk, exercising, or cleaning  Prevent weight gain after you quit:  You may gain a few pounds after you quit smoking  It is healthier for you to gain a few pounds than to continue to smoke  The following can help you prevent weight gain:    · Eat healthy foods  These include fruits, vegetables, whole-grain breads, low-fat dairy products, beans, lean meats, and fish  Eat healthy snacks, such as low-fat yogurt, if you get hungry between meals  · Drink water before, during, and between meals  This will make your stomach feel full and help prevent you from overeating  Ask your healthcare provider how much liquid to drink each day and which liquids are best for you  · Exercise  Take a walk or do some kind of exercise every day  Ask your healthcare provider what exercise is right for you  This may help reduce your cravings and reduce stress

## 2019-07-15 NOTE — PROGRESS NOTES
Jin Rodrigues is a 25 y o  female who presents today for annual GYN exam   Her last pap smear was performed 2016 and result was NILM  She reports no history of abnormal pap smears in her past   She reports menses as regular  Patient's last menstrual period was 2019 (approximate)  Her contraceptive method is nothing - desires to resume Depoprovera (has been greater than a year since last injection)  Her general medical history has been reviewed and she reports it as follows:    Past Medical History:   Diagnosis Date    Chlamydia 2016    Depression     no meds    Fibromyalgia     no meds     Past Surgical History:   Procedure Laterality Date    NO PAST SURGERIES       OB History        3    Para   3    Term   2       1    AB   0    Living   3       SAB   0    TAB   0    Ectopic   0    Multiple   0    Live Births   3               Social History     Tobacco Use    Smoking status: Current Some Day Smoker     Packs/day: 0 25     Types: Cigarettes    Smokeless tobacco: Current User   Substance Use Topics    Alcohol use: Yes     Frequency: 2-4 times a month     Drinks per session: 1 or 2    Drug use: Yes     Types: Marijuana     Comment: intermittent use     Cancer-related family history includes Cancer in her maternal uncle  There is no history of Breast cancer  Current Outpatient Medications:     medroxyPROGESTERone (DEPO-PROVERA) 150 mg/mL injection, Inject 1 mL (150 mg total) into a muscle every 3 (three) months, Disp: 1 mL, Rfl: 3    Review of Systems:  Review of Systems   Constitutional: Negative  Gastrointestinal: Negative  Genitourinary: Positive for vaginal discharge  Negative for difficulty urinating, dysuria, menstrual problem and pelvic pain  Vaginal odor   Skin: Negative          Physical Exam:  /70   Ht 5' 3" (1 6 m)   Wt 61 1 kg (134 lb 9 6 oz)   LMP 2019 (Approximate)   BMI 23 84 kg/m² Physical Exam   Constitutional: She is oriented to person, place, and time  She appears well-developed and well-nourished  Genitourinary: Uterus normal  There is no lesion on the right labia  There is no lesion on the left labia  Vagina exhibits no lesion and no rugosity  Vaginal discharge found  Right adnexum does not display mass and does not display tenderness  Left adnexum does not display mass and does not display tenderness  Cervix does not exhibit motion tenderness, lesion or pinkness  Uterus is not tender  Neck: Neck supple  No thyromegaly present  Cardiovascular: Normal rate and regular rhythm  Pulmonary/Chest: Effort normal and breath sounds normal  Right breast exhibits no mass, no nipple discharge, no skin change and no tenderness  Left breast exhibits no mass, no nipple discharge, no skin change and no tenderness  Abdominal: Soft  Bowel sounds are normal    Neurological: She is alert and oriented to person, place, and time  Skin: Skin is warm and dry  Point of Care Testing:   -Wet mount: few WBC's, no trichomonads, + clue cells   -KOH mount: neg   -Whiff: POSITIVE   -urine HCG: negative    Assessment/Plan:   1  Abnormal well-woman GYN exam   2  Cervical cancer screening:  Pap smear done with HPV co-testing  3  STD screening:  Orders placed for vaginal GC/CT cultures  Orders placed for serum anti-HIV, anti-HCV, HbsAg, RPR    3  Breast cancer screening:  Normal breast exam    4  Depression Screening: Patient's depression screening was positive with a PHQ-2 score of 3  Their PHQ-9 score was 9  Patient verbalizes desire to start mental health counseling  Referral placed for  consultation  5  Tobacco Cessation Counseling: Tobacco cessation counseling and education was provided  The patient is sincerely urged to quit consumption of tobacco  She is not ready to quit tobacco  The numerous health risks of tobacco consumption were discussed     6  Bacterial Vaginosis: Given Rx Metrogel  7  Unwanted fertility:  Given Rx Depoprovera  Urine pregnancy test negative  Will give injection today or tomorrow  8  Return to office in 3 months for next Depoprovera injection

## 2019-07-16 ENCOUNTER — TRANSCRIBE ORDERS (OUTPATIENT)
Dept: LAB | Facility: CLINIC | Age: 29
End: 2019-07-16

## 2019-07-16 ENCOUNTER — CLINICAL SUPPORT (OUTPATIENT)
Dept: OBGYN CLINIC | Facility: CLINIC | Age: 29
End: 2019-07-16

## 2019-07-16 ENCOUNTER — APPOINTMENT (OUTPATIENT)
Dept: LAB | Facility: CLINIC | Age: 29
End: 2019-07-16
Payer: COMMERCIAL

## 2019-07-16 ENCOUNTER — PATIENT OUTREACH (OUTPATIENT)
Dept: OBGYN CLINIC | Facility: CLINIC | Age: 29
End: 2019-07-16

## 2019-07-16 DIAGNOSIS — Z30.09 UNWANTED FERTILITY: Primary | ICD-10-CM

## 2019-07-16 DIAGNOSIS — Z11.3 SCREEN FOR STD (SEXUALLY TRANSMITTED DISEASE): ICD-10-CM

## 2019-07-16 PROCEDURE — 87340 HEPATITIS B SURFACE AG IA: CPT

## 2019-07-16 PROCEDURE — 96372 THER/PROPH/DIAG INJ SC/IM: CPT

## 2019-07-16 PROCEDURE — 86592 SYPHILIS TEST NON-TREP QUAL: CPT

## 2019-07-16 PROCEDURE — 36415 COLL VENOUS BLD VENIPUNCTURE: CPT

## 2019-07-16 PROCEDURE — 86803 HEPATITIS C AB TEST: CPT

## 2019-07-16 PROCEDURE — 87389 HIV-1 AG W/HIV-1&-2 AB AG IA: CPT

## 2019-07-16 RX ORDER — MEDROXYPROGESTERONE ACETATE 150 MG/ML
150 INJECTION, SUSPENSION INTRAMUSCULAR ONCE
Status: COMPLETED | OUTPATIENT
Start: 2019-07-16 | End: 2019-07-16

## 2019-07-16 RX ADMIN — MEDROXYPROGESTERONE ACETATE 150 MG: 150 INJECTION, SUSPENSION INTRAMUSCULAR at 09:14

## 2019-07-17 LAB
C TRACH DNA SPEC QL NAA+PROBE: NEGATIVE
HBV SURFACE AG SER QL: NORMAL
HCV AB SER QL: NORMAL
HIV 1+2 AB+HIV1 P24 AG SERPL QL IA: NORMAL
HPV HR 12 DNA CVX QL NAA+PROBE: NEGATIVE
HPV16 DNA CVX QL NAA+PROBE: NEGATIVE
HPV18 DNA CVX QL NAA+PROBE: NEGATIVE
N GONORRHOEA DNA SPEC QL NAA+PROBE: NEGATIVE
RPR SER QL: NORMAL

## 2019-07-18 NOTE — PROGRESS NOTES
LINDA Care Manager contacted [facility] ([address]) and scheduled an intake appointment for the pt for 7/22/19 @ 1:30 p m  LINDA will provide this information to the pt and confirm her attendance

## 2019-07-18 NOTE — PROGRESS NOTES
LINDA Care Manager met with pt at her GYN visit regarding [address] and housing  Pt states she is currently living in a room with her children  Pt states she works and is working with the [employer] regarding rental assistance  Pt states she has already applied and discussed [email or web address]  Pt states she has been in treatment before and would like assistance getting back into treatment not only to assist with the housing needs, but also her MH and overall well being  Pt states she has been extremely depressed and anxious lately but that she recognizes that this is because of her housing and overall life situation at this time  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

## 2019-07-18 NOTE — PROGRESS NOTES
LINDA Care Manager contacted pt regarding MH intake  Pt states she is able to attend this appointment and requested the information be emailed to  LINDA did so and will f/u to see if pt attends this appointment

## 2019-07-19 ENCOUNTER — TELEPHONE (OUTPATIENT)
Dept: OBGYN CLINIC | Facility: CLINIC | Age: 29
End: 2019-07-19

## 2019-07-19 LAB
LAB AP GYN PRIMARY INTERPRETATION: ABNORMAL
Lab: ABNORMAL
PATH INTERP SPEC-IMP: ABNORMAL

## 2019-07-19 NOTE — TELEPHONE ENCOUNTER
Patient called back  Advised her of abnormal pap results and negative STD testing  Recommend colposcopy  Patient agrees

## 2019-08-15 ENCOUNTER — PATIENT OUTREACH (OUTPATIENT)
Dept: OBGYN CLINIC | Facility: CLINIC | Age: 29
End: 2019-08-15

## 2019-09-06 ENCOUNTER — PATIENT OUTREACH (OUTPATIENT)
Dept: FAMILY MEDICINE CLINIC | Facility: CLINIC | Age: 29
End: 2019-09-06

## 2019-09-06 ENCOUNTER — DOCUMENTATION (OUTPATIENT)
Dept: FAMILY MEDICINE CLINIC | Facility: CLINIC | Age: 29
End: 2019-09-06

## 2019-09-06 ENCOUNTER — OFFICE VISIT (OUTPATIENT)
Dept: FAMILY MEDICINE CLINIC | Facility: CLINIC | Age: 29
End: 2019-09-06

## 2019-09-06 VITALS
RESPIRATION RATE: 20 BRPM | OXYGEN SATURATION: 98 % | WEIGHT: 128 LBS | HEIGHT: 63 IN | BODY MASS INDEX: 22.68 KG/M2 | DIASTOLIC BLOOD PRESSURE: 80 MMHG | TEMPERATURE: 97.7 F | SYSTOLIC BLOOD PRESSURE: 120 MMHG | HEART RATE: 68 BPM

## 2019-09-06 DIAGNOSIS — Z62.810 HISTORY OF SEXUAL ABUSE IN CHILDHOOD: ICD-10-CM

## 2019-09-06 DIAGNOSIS — F33.1 MODERATE EPISODE OF RECURRENT MAJOR DEPRESSIVE DISORDER (HCC): Primary | ICD-10-CM

## 2019-09-06 DIAGNOSIS — Z59.00 HOMELESS FAMILY: ICD-10-CM

## 2019-09-06 PROCEDURE — 99213 OFFICE O/P EST LOW 20 MIN: CPT | Performed by: FAMILY MEDICINE

## 2019-09-06 PROCEDURE — 3008F BODY MASS INDEX DOCD: CPT | Performed by: FAMILY MEDICINE

## 2019-09-06 PROCEDURE — 3725F SCREEN DEPRESSION PERFORMED: CPT | Performed by: FAMILY MEDICINE

## 2019-09-06 RX ORDER — ESCITALOPRAM OXALATE 10 MG/1
10 TABLET ORAL DAILY
Qty: 30 TABLET | Refills: 2 | Status: SHIPPED | OUTPATIENT
Start: 2019-09-06 | End: 2020-05-28 | Stop reason: SDDI

## 2019-09-06 SDOH — ECONOMIC STABILITY - HOUSING INSECURITY: HOMELESSNESS UNSPECIFIED: Z59.00

## 2019-09-06 NOTE — LETTER
Milderd Saint is our patient here at [facility]  She was evaluated today 9/6/2019  Patient is homeless and is a mother to three children 11,12 and 3 years  I am writing to recommend access to a homeless shelter as she attempts to work and obtain her own accomodation  Please call [facility] at the number above if you need any clarification       America Ibrahim MD  9/6/2019
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
## 2019-09-06 NOTE — ASSESSMENT & PLAN NOTE
Diagnosed years ago, was on medication in the past    Currently under a great deal of stress  Patient's mum currently very ill at [facility]  Currently employed at a staffing agency  Needs assistance with accessing  Community resources  Placed referral to case management  History of suicide attempt as a child, positive suicide ideation but no plan  Believes her children are a good reason to remain alive  RX Lexapro 10 mg daily  F/u in one month
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
## 2019-09-11 ENCOUNTER — PATIENT OUTREACH (OUTPATIENT)
Dept: FAMILY MEDICINE CLINIC | Facility: CLINIC | Age: 29
End: 2019-09-11

## 2019-09-12 NOTE — PROGRESS NOTES
Pt presented to clinic very upset that C&Y had visited her home  LINDA HYDE spoke with pt and reminded pt that she was told at the time of her visit that C&Y would be called due to safety concerns for her children being homeless  Pt states the  wants to take her children away and that our staff "promised they wouldn't take my children"  LINDA HYDE reiterated that no promises were made at time of previous encounter and that LINDA HYDE and staff do not have access to pt's history with C&Y  Pt states she must call C&Y  every night and children must speak with  to state they have a place to sleep  LINDA HYDE encouraged pt to continue working with C&Y  LINDA HYDE also discussed pt's appt with [facility] that was set up for her in July  Pt states she did not attend and did not call them to reschedule  LINDA HYDE reiterated the importance of attending her [address] appt in order to get a letter for [email or web address]  Pt provided with MH resources and encouraged to make an appt as soon as possible

## 2019-09-26 ENCOUNTER — PATIENT OUTREACH (OUTPATIENT)
Dept: FAMILY MEDICINE CLINIC | Facility: CLINIC | Age: 29
End: 2019-09-26

## 2019-10-15 ENCOUNTER — TELEPHONE (OUTPATIENT)
Dept: FAMILY MEDICINE CLINIC | Facility: CLINIC | Age: 29
End: 2019-10-15

## 2019-10-15 NOTE — TELEPHONE ENCOUNTER
I think such urgent matters should be brought to the attention of an attending physician in the clinic  I am on night shift and certainly cant respond to this in time  Please print the most recent H&P for the patient to submit

## 2019-10-15 NOTE — TELEPHONE ENCOUNTER
Patient walked in stating she needs a letter explaining all her health issues and chronic conditions for a voucher on a renting program due to her being homeless at this moment with her children  Patient found an apartment with a [employer] and will need this letter no later than today if possible and states it is urgent  Please advice

## 2019-10-16 ENCOUNTER — PATIENT OUTREACH (OUTPATIENT)
Dept: OBGYN CLINIC | Facility: CLINIC | Age: 29
End: 2019-10-16

## 2019-10-16 NOTE — TELEPHONE ENCOUNTER
Dr Arriaga Peers    Just to close the loop   I am seeing this patient with Dr Luh Wallace will keep you posted

## 2019-10-16 NOTE — TELEPHONE ENCOUNTER
Pt would like to be seen by you if possible this week  Pt needs letter stating pt has chronic bipolar disorder and depression  This message is also regarding the messages below

## 2019-10-17 NOTE — PROGRESS NOTES
LINDA Care Manager consulted to see pt regarding her homelessness and proof of MH treatment  Pt is requesting a letter from LINDA HYDE that shows her diagnosis and active status in OP MH  LINDA explained that this needs to come from the OP [address] provider directly  LINDA offered to advocate with the  from [facility] if needed  Pt stated she did not have the 's name or information with her but would call LINDA to provide this information  Pt states she has an appointment with a new therapist at [facility] on 10/21/19  LINDA encouraged pt to keep this appointment and that she may have to be seen for more than one visit before they would provide her with a letter of treatment  LINDA also encouraged pt to discuss this at her intake as well as with her  from [facility] so that she can meet the deadlines needed for rental assistance  Pt verbalized understanding and states she will be attending this appointment  LINDA provided pt with contact information and will remain available for further consult as needed

## 2019-10-30 ENCOUNTER — PATIENT OUTREACH (OUTPATIENT)
Dept: OBGYN CLINIC | Facility: CLINIC | Age: 29
End: 2019-10-30

## 2019-11-05 ENCOUNTER — APPOINTMENT (EMERGENCY)
Dept: RADIOLOGY | Facility: HOSPITAL | Age: 29
End: 2019-11-05
Payer: COMMERCIAL

## 2019-11-05 ENCOUNTER — HOSPITAL ENCOUNTER (EMERGENCY)
Facility: HOSPITAL | Age: 29
Discharge: HOME | End: 2019-11-05
Attending: EMERGENCY MEDICINE
Payer: COMMERCIAL

## 2019-11-05 VITALS
OXYGEN SATURATION: 98 % | TEMPERATURE: 98.2 F | WEIGHT: 134.04 LBS | BODY MASS INDEX: 23.74 KG/M2 | DIASTOLIC BLOOD PRESSURE: 74 MMHG | RESPIRATION RATE: 18 BRPM | SYSTOLIC BLOOD PRESSURE: 126 MMHG | HEART RATE: 78 BPM

## 2019-11-05 DIAGNOSIS — R07.89 CHEST WALL PAIN: ICD-10-CM

## 2019-11-05 LAB
EXT PREG TEST URINE: NEGATIVE
EXT. CONTROL ED NAV: NORMAL

## 2019-11-05 PROCEDURE — 93005 ELECTROCARDIOGRAM TRACING: CPT

## 2019-11-05 PROCEDURE — 99285 EMERGENCY DEPT VISIT HI MDM: CPT

## 2019-11-05 PROCEDURE — 71046 X-RAY EXAM CHEST 2 VIEWS: CPT

## 2019-11-05 PROCEDURE — 99285 EMERGENCY DEPT VISIT HI MDM: CPT | Performed by: EMERGENCY MEDICINE

## 2019-11-05 PROCEDURE — 96372 THER/PROPH/DIAG INJ SC/IM: CPT

## 2019-11-05 PROCEDURE — 81025 URINE PREGNANCY TEST: CPT | Performed by: EMERGENCY MEDICINE

## 2019-11-05 RX ORDER — KETOROLAC TROMETHAMINE 30 MG/ML
30 INJECTION, SOLUTION INTRAMUSCULAR; INTRAVENOUS ONCE
Status: COMPLETED | OUTPATIENT
Start: 2019-11-05 | End: 2019-11-05

## 2019-11-05 RX ORDER — NAPROXEN 500 MG/1
500 TABLET ORAL 2 TIMES DAILY PRN
Qty: 30 TABLET | Refills: 0 | Status: SHIPPED | OUTPATIENT
Start: 2019-11-05 | End: 2020-06-05

## 2019-11-05 RX ADMIN — KETOROLAC TROMETHAMINE 30 MG: 30 INJECTION, SOLUTION INTRAMUSCULAR; INTRAVENOUS at 20:51

## 2019-11-06 LAB
ATRIAL RATE: 82 BPM
P AXIS: 57 DEGREES
PR INTERVAL: 136 MS
QRS AXIS: 26 DEGREES
QRSD INTERVAL: 90 MS
QT INTERVAL: 388 MS
QTC INTERVAL: 453 MS
T WAVE AXIS: 32 DEGREES
VENTRICULAR RATE: 82 BPM

## 2019-11-06 PROCEDURE — 93010 ELECTROCARDIOGRAM REPORT: CPT | Performed by: INTERNAL MEDICINE

## 2019-11-06 NOTE — ED PROCEDURE NOTE
PROCEDURE  ECG 12 Lead Documentation Only  Date/Time: 11/5/2019 8:24 PM  Performed by: Mariam Cota MD  Authorized by: Mariam Cota MD     Indications / Diagnosis:  Cp  ECG reviewed by me, the ED Provider: yes    Patient location:  ED  Previous ECG:     Comparison to cardiac monitor: Yes    Interpretation:     Interpretation: normal    Rate:     ECG rate:  82    ECG rate assessment: normal    Rhythm:     Rhythm: sinus rhythm    Ectopy:     Ectopy: none    QRS:     QRS axis:  Normal    QRS intervals:  Normal  ST segments:     ST segments:  Normal  T waves:     T waves: normal           Mariam Cota MD  11/05/19 2024

## 2019-11-06 NOTE — ED PROVIDER NOTES
History  Chief Complaint   Patient presents with    Chest Pain     midsternal cp that started last night with SOB  States she feels better holding pressure on affected area     Patient is a 69-year-old female with a history of fibromyalgia who presents with a 1 day history of sternal chest pain that started last night  Patient states pain woke her up out of sleep has been a constant sharp pain that does not radiate  Patient states that placing her hand on her chest makes the pain better  Any attempted movement cough makes it worse  Patient does admit to a nonproductive cough  No fevers sweats or chills  Patient did not taking medication for it  Denies any history of similar pain  States that her normal fibromyalgia pain is in her lower back and her shoulders  Denies any history of diabetes high blood pressure high cholesterol  No recent travel  Does admit to smoking 6-7 cigarettes a day  Prior to Admission Medications   Prescriptions Last Dose Informant Patient Reported? Taking?   escitalopram (LEXAPRO) 10 mg tablet   No No   Sig: Take 1 tablet (10 mg total) by mouth daily for 30 days   medroxyPROGESTERone (DEPO-PROVERA) 150 mg/mL injection   No No   Sig: Inject 1 mL (150 mg total) into a muscle every 3 (three) months   metroNIDAZOLE (METROGEL) 0 75 % vaginal gel   No No   Sig: Insert 1 application into the vagina daily at bedtime   Patient not taking: Reported on 9/6/2019      Facility-Administered Medications: None       Past Medical History:   Diagnosis Date    Chlamydia 2016    Depression     no meds    Fibromyalgia     no meds       Past Surgical History:   Procedure Laterality Date    NO PAST SURGERIES         Family History   Problem Relation Age of Onset    Cancer Maternal Uncle         lung    Breast cancer Neg Hx      I have reviewed and agree with the history as documented      Social History     Tobacco Use    Smoking status: Current Some Day Smoker     Packs/day: 0 25 Types: Cigarettes    Smokeless tobacco: Current User   Substance Use Topics    Alcohol use: Yes     Frequency: 2-4 times a month     Drinks per session: 1 or 2    Drug use: Yes     Types: Marijuana     Comment: intermittent use        Review of Systems   Constitutional: Negative  HENT: Negative  Eyes: Negative  Respiratory: Positive for cough  Cardiovascular: Positive for chest pain  Gastrointestinal: Negative  Negative for abdominal pain  Endocrine: Negative  Genitourinary: Negative  Musculoskeletal: Negative  Skin: Negative  Allergic/Immunologic: Negative  Neurological: Negative  Hematological: Negative  Psychiatric/Behavioral: Negative  All other systems reviewed and are negative  Physical Exam  Physical Exam   Constitutional: She is oriented to person, place, and time  She appears well-developed and well-nourished  HENT:   Head: Normocephalic  Eyes: Pupils are equal, round, and reactive to light  Neck: Normal range of motion  Neck supple  Cardiovascular: Normal rate, regular rhythm, intact distal pulses and normal pulses  Exam reveals no gallop, no S3 and no friction rub  No murmur heard  Pulmonary/Chest: Effort normal and breath sounds normal    Patient with reproducible sternal tenderness palpation  Abdominal: Soft  Bowel sounds are normal    Musculoskeletal: Normal range of motion  Right lower leg: Normal  She exhibits no tenderness and no edema  Left lower leg: Normal  She exhibits no tenderness and no edema  Neurological: She is alert and oriented to person, place, and time  Skin: Skin is warm and dry  Capillary refill takes less than 2 seconds  Psychiatric: Her behavior is normal  Her mood appears anxious  Nursing note and vitals reviewed        Vital Signs  ED Triage Vitals [11/05/19 2001]   Temperature Pulse Respirations Blood Pressure SpO2   98 2 °F (36 8 °C) 78 18 126/74 98 %      Temp Source Heart Rate Source Patient Position - Orthostatic VS BP Location FiO2 (%)   Tympanic Monitor Lying Left arm --      Pain Score       Worst Possible Pain           Vitals:    11/05/19 2001   BP: 126/74   Pulse: 78   Patient Position - Orthostatic VS: Lying         Visual Acuity      ED Medications  Medications   ketorolac (TORADOL) injection 30 mg (30 mg Intramuscular Given 11/5/19 2051)       Diagnostic Studies  Results Reviewed     Procedure Component Value Units Date/Time    POCT pregnancy, urine [[id number]]  (Normal) Resulted:  11/05/19 2051    Lab Status:  Final result Updated:  11/05/19 2051     EXT PREG TEST UR (Ref: Negative) negative     Control valid                 XR chest pa & lateral   ED Interpretation by Gini Mensah MD (11/05 2116)   NAD                 Procedures  Procedures       ED Course  ED Course as of Nov 05 2120   Tue Nov 05, 2019 2027 Make patient aware of normal EKG results  Still waiting on urine for Toradol and x-ray  Patient states LMP was about 2 months ago was on the Depo shot  2118 Went over chest x-ray EKG results with patient again  Feeling improved  Will discharge with NSAIDs follow up with PCP return the ER for any concerns                    PERC Rule for PE      Most Recent Value   PERC Rule for PE   Age >=50  0 Filed at: 11/05/2019 2007   HR >=100  0 Filed at: 11/05/2019 2007   O2 Sat on room air < 95%  0 Filed at: 11/05/2019 2007   History of PE or DVT  0 Filed at: 11/05/2019 2007   Recent trauma or surgery  0 Filed at: 11/05/2019 2007   Hemoptysis  0 Filed at: 11/05/2019 2007   Exogenous estrogen  1 Filed at: 11/05/2019 2007   Unilateral leg swelling  0 Filed at: 11/05/2019 2007   PERC Rule for PE Results  1 Filed at: 11/05/2019 2007                      MDM  Number of Diagnoses or Management Options  Chest wall pain:      Amount and/or Complexity of Data Reviewed  Tests in the radiology section of CPT®: reviewed  Tests in the medicine section of CPT®: ordered and reviewed  Review and summarize past medical records: yes  Independent visualization of images, tracings, or specimens: yes        Disposition  Final diagnoses:   Chest wall pain     Time reflects when diagnosis was documented in both MDM as applicable and the Disposition within this note     Time User Action Codes Description Comment    11/5/2019  9:18 PM Juaquin Humza Add [R07 89] Chest wall pain     11/5/2019  9:19 PM Juaquin Humza Modify [R07 89] Chest wall pain       ED Disposition     ED Disposition Condition Date/Time Comment    Discharge Stable Tue Nov 5, 2019  9:18 PM Emerald Aguirre discharge to home/self care  Follow-up Information     Follow up With Specialties Details [place]   [facility]  [address]  [phone]            Patient's Medications   Discharge Prescriptions    NAPROXEN (NAPROSYN) 500 MG TABLET    Take 1 tablet (500 mg total) by mouth 2 (two) times a day as needed for mild pain for up to 16 days       Start Date: 11/5/2019 End Date: 11/21/2019       Order Dose: 500 mg       Quantity: 30 tablet    Refills: 0     No discharge procedures on file      ED Provider  Electronically Signed by           Pura Robertson MD  11/05/19 4856

## 2019-11-12 ENCOUNTER — PATIENT OUTREACH (OUTPATIENT)
Dept: OBGYN CLINIC | Facility: CLINIC | Age: 29
End: 2019-11-12

## 2019-11-12 NOTE — PROGRESS NOTES
SW Care Manager received return call from pt  Pt states she is unsure of how her  is at the [employer] and that she has gone in and left VM's trying to find out more information  Pt states she has found a place but needs confirmation on the rental assistance  SW also contacted the agency but was told to leave a message on the intake line  SW will continue to assist/support pt and will reach out if SW receives a respond call from [employer]  Contact information provided to pt

## 2020-02-04 ENCOUNTER — TELEPHONE (OUTPATIENT)
Dept: OBGYN CLINIC | Facility: CLINIC | Age: 30
End: 2020-02-04

## 2020-02-04 NOTE — TELEPHONE ENCOUNTER
LM on VM for CB from patient  She still has not coordinated colposcopy as recommended for HSIL pap in 7/2019  Will have our nursing staff attempt to contact her and/or send letter if necessary

## 2020-02-07 ENCOUNTER — TELEPHONE (OUTPATIENT)
Dept: OBGYN CLINIC | Facility: CLINIC | Age: 30
End: 2020-02-07

## 2020-04-29 ENCOUNTER — CLINICAL SUPPORT (OUTPATIENT)
Dept: OBGYN CLINIC | Facility: CLINIC | Age: 30
End: 2020-04-29

## 2020-04-29 VITALS
WEIGHT: 132.4 LBS | SYSTOLIC BLOOD PRESSURE: 135 MMHG | DIASTOLIC BLOOD PRESSURE: 84 MMHG | TEMPERATURE: 98.4 F | BODY MASS INDEX: 23.45 KG/M2

## 2020-04-29 DIAGNOSIS — Z30.42 ENCOUNTER FOR SURVEILLANCE OF INJECTABLE CONTRACEPTIVE: Primary | ICD-10-CM

## 2020-04-29 LAB — SL AMB POCT URINE HCG: NEGATIVE

## 2020-04-29 PROCEDURE — 81025 URINE PREGNANCY TEST: CPT

## 2020-04-29 PROCEDURE — 96372 THER/PROPH/DIAG INJ SC/IM: CPT

## 2020-04-29 PROCEDURE — 99211 OFF/OP EST MAY X REQ PHY/QHP: CPT

## 2020-04-29 RX ORDER — MEDROXYPROGESTERONE ACETATE 150 MG/ML
150 INJECTION, SUSPENSION INTRAMUSCULAR ONCE
Status: COMPLETED | OUTPATIENT
Start: 2020-04-29 | End: 2020-04-29

## 2020-04-29 RX ADMIN — MEDROXYPROGESTERONE ACETATE 150 MG: 150 INJECTION, SUSPENSION INTRAMUSCULAR at 14:28

## 2020-05-19 ENCOUNTER — PROCEDURE VISIT (OUTPATIENT)
Dept: OBGYN CLINIC | Facility: CLINIC | Age: 30
End: 2020-05-19

## 2020-05-19 VITALS
TEMPERATURE: 98.5 F | WEIGHT: 133 LBS | BODY MASS INDEX: 23.56 KG/M2 | SYSTOLIC BLOOD PRESSURE: 119 MMHG | DIASTOLIC BLOOD PRESSURE: 70 MMHG

## 2020-05-19 DIAGNOSIS — R87.613 HIGH GRADE SQUAMOUS INTRAEPITHELIAL LESION (HGSIL) ON CYTOLOGIC SMEAR OF CERVIX: Primary | ICD-10-CM

## 2020-05-19 DIAGNOSIS — R87.613 HGSIL ON CYTOLOGIC SMEAR OF CERVIX: ICD-10-CM

## 2020-05-19 LAB — SL AMB POCT URINE HCG: NEGATIVE

## 2020-05-19 PROCEDURE — 88305 TISSUE EXAM BY PATHOLOGIST: CPT | Performed by: PATHOLOGY

## 2020-05-19 PROCEDURE — 57454 BX/CURETT OF CERVIX W/SCOPE: CPT | Performed by: OBSTETRICS & GYNECOLOGY

## 2020-05-19 PROCEDURE — 4004F PT TOBACCO SCREEN RCVD TLK: CPT | Performed by: OBSTETRICS & GYNECOLOGY

## 2020-05-19 PROCEDURE — 81025 URINE PREGNANCY TEST: CPT | Performed by: OBSTETRICS & GYNECOLOGY

## 2020-05-19 PROCEDURE — 99214 OFFICE O/P EST MOD 30 MIN: CPT | Performed by: OBSTETRICS & GYNECOLOGY

## 2020-05-28 ENCOUNTER — OFFICE VISIT (OUTPATIENT)
Dept: OBGYN CLINIC | Facility: CLINIC | Age: 30
End: 2020-05-28

## 2020-05-28 VITALS
SYSTOLIC BLOOD PRESSURE: 147 MMHG | HEART RATE: 73 BPM | TEMPERATURE: 98.8 F | BODY MASS INDEX: 24.45 KG/M2 | DIASTOLIC BLOOD PRESSURE: 90 MMHG | WEIGHT: 138 LBS

## 2020-05-28 DIAGNOSIS — R87.613 HGSIL ON CYTOLOGIC SMEAR OF CERVIX: Primary | ICD-10-CM

## 2020-05-28 DIAGNOSIS — R03.0 ELEVATED BLOOD PRESSURE READING: ICD-10-CM

## 2020-05-28 PROCEDURE — 3077F SYST BP >= 140 MM HG: CPT | Performed by: OBSTETRICS & GYNECOLOGY

## 2020-05-28 PROCEDURE — 99214 OFFICE O/P EST MOD 30 MIN: CPT | Performed by: OBSTETRICS & GYNECOLOGY

## 2020-05-28 PROCEDURE — 3080F DIAST BP >= 90 MM HG: CPT | Performed by: OBSTETRICS & GYNECOLOGY

## 2020-05-28 PROCEDURE — 4004F PT TOBACCO SCREEN RCVD TLK: CPT | Performed by: OBSTETRICS & GYNECOLOGY

## 2020-06-01 ENCOUNTER — TELEPHONE (OUTPATIENT)
Dept: OBGYN CLINIC | Facility: CLINIC | Age: 30
End: 2020-06-01

## 2020-06-03 ENCOUNTER — TELEMEDICINE (OUTPATIENT)
Dept: FAMILY MEDICINE CLINIC | Facility: CLINIC | Age: 30
End: 2020-06-03

## 2020-06-03 DIAGNOSIS — R03.0 ELEVATED BLOOD PRESSURE READING: Primary | ICD-10-CM

## 2020-06-03 PROBLEM — I10 ESSENTIAL HYPERTENSION: Status: ACTIVE | Noted: 2020-06-03

## 2020-06-03 PROCEDURE — 99213 OFFICE O/P EST LOW 20 MIN: CPT | Performed by: FAMILY MEDICINE

## 2020-06-05 ENCOUNTER — CLINICAL SUPPORT (OUTPATIENT)
Dept: FAMILY MEDICINE CLINIC | Facility: CLINIC | Age: 30
End: 2020-06-05

## 2020-06-05 VITALS — SYSTOLIC BLOOD PRESSURE: 124 MMHG | TEMPERATURE: 97.9 F | DIASTOLIC BLOOD PRESSURE: 78 MMHG

## 2020-06-05 DIAGNOSIS — Z01.411 ENCOUNTER FOR WELL WOMAN EXAM WITH ABNORMAL FINDINGS: ICD-10-CM

## 2020-06-05 DIAGNOSIS — I10 HYPERTENSION, UNSPECIFIED TYPE: Primary | ICD-10-CM

## 2020-06-05 PROCEDURE — U0003 INFECTIOUS AGENT DETECTION BY NUCLEIC ACID (DNA OR RNA); SEVERE ACUTE RESPIRATORY SYNDROME CORONAVIRUS 2 (SARS-COV-2) (CORONAVIRUS DISEASE [COVID-19]), AMPLIFIED PROBE TECHNIQUE, MAKING USE OF HIGH THROUGHPUT TECHNOLOGIES AS DESCRIBED BY CMS-2020-01-R: HCPCS

## 2020-06-06 LAB — SARS-COV-2 RNA SPEC QL NAA+PROBE: NOT DETECTED

## 2020-06-12 ENCOUNTER — ANESTHESIA (OUTPATIENT)
Dept: PERIOP | Facility: HOSPITAL | Age: 30
End: 2020-06-12
Payer: COMMERCIAL

## 2020-06-12 ENCOUNTER — HOSPITAL ENCOUNTER (OUTPATIENT)
Facility: HOSPITAL | Age: 30
Setting detail: OUTPATIENT SURGERY
Discharge: HOME | End: 2020-06-12
Attending: OBSTETRICS & GYNECOLOGY | Admitting: OBSTETRICS & GYNECOLOGY
Payer: COMMERCIAL

## 2020-06-12 ENCOUNTER — ANESTHESIA EVENT (OUTPATIENT)
Dept: PERIOP | Facility: HOSPITAL | Age: 30
End: 2020-06-12
Payer: COMMERCIAL

## 2020-06-12 VITALS
TEMPERATURE: 98.2 F | RESPIRATION RATE: 20 BRPM | WEIGHT: 138 LBS | SYSTOLIC BLOOD PRESSURE: 144 MMHG | DIASTOLIC BLOOD PRESSURE: 98 MMHG | HEIGHT: 63 IN | BODY MASS INDEX: 24.45 KG/M2 | HEART RATE: 68 BPM | OXYGEN SATURATION: 100 %

## 2020-06-12 DIAGNOSIS — Z01.411 ENCOUNTER FOR WELL WOMAN EXAM WITH ABNORMAL FINDINGS: ICD-10-CM

## 2020-06-12 LAB
ABO GROUP BLD: NORMAL
BASOPHILS # BLD AUTO: 0.04 THOUSANDS/ΜL (ref 0–0.1)
BASOPHILS NFR BLD AUTO: 1 % (ref 0–1)
BLD GP AB SCN SERPL QL: NEGATIVE
EOSINOPHIL # BLD AUTO: 0.07 THOUSAND/ΜL (ref 0–0.61)
EOSINOPHIL NFR BLD AUTO: 1 % (ref 0–6)
ERYTHROCYTE [DISTWIDTH] IN BLOOD BY AUTOMATED COUNT: 13 % (ref 11.6–15.1)
EXT PREGNANCY TEST URINE: NEGATIVE
EXT. CONTROL: NORMAL
HCT VFR BLD AUTO: 37 % (ref 34.8–46.1)
HGB BLD-MCNC: 12.3 G/DL (ref 11.5–15.4)
IMM GRANULOCYTES # BLD AUTO: 0.02 THOUSAND/UL (ref 0–0.2)
IMM GRANULOCYTES NFR BLD AUTO: 0 % (ref 0–2)
LYMPHOCYTES # BLD AUTO: 2.45 THOUSANDS/ΜL (ref 0.6–4.47)
LYMPHOCYTES NFR BLD AUTO: 43 % (ref 14–44)
MCH RBC QN AUTO: 33 PG (ref 26.8–34.3)
MCHC RBC AUTO-ENTMCNC: 33.2 G/DL (ref 31.4–37.4)
MCV RBC AUTO: 99 FL (ref 82–98)
MONOCYTES # BLD AUTO: 0.31 THOUSAND/ΜL (ref 0.17–1.22)
MONOCYTES NFR BLD AUTO: 5 % (ref 4–12)
NEUTROPHILS # BLD AUTO: 2.81 THOUSANDS/ΜL (ref 1.85–7.62)
NEUTS SEG NFR BLD AUTO: 50 % (ref 43–75)
NRBC BLD AUTO-RTO: 0 /100 WBCS
PLATELET # BLD AUTO: 253 THOUSANDS/UL (ref 149–390)
PMV BLD AUTO: 9.1 FL (ref 8.9–12.7)
RBC # BLD AUTO: 3.73 MILLION/UL (ref 3.81–5.12)
RH BLD: POSITIVE
SPECIMEN EXPIRATION DATE: NORMAL
WBC # BLD AUTO: 5.7 THOUSAND/UL (ref 4.31–10.16)

## 2020-06-12 PROCEDURE — 81025 URINE PREGNANCY TEST: CPT | Performed by: OBSTETRICS & GYNECOLOGY

## 2020-06-12 PROCEDURE — 86850 RBC ANTIBODY SCREEN: CPT | Performed by: OBSTETRICS & GYNECOLOGY

## 2020-06-12 PROCEDURE — 85025 COMPLETE CBC W/AUTO DIFF WBC: CPT | Performed by: OBSTETRICS & GYNECOLOGY

## 2020-06-12 PROCEDURE — 57522 CONIZATION OF CERVIX: CPT | Performed by: OBSTETRICS & GYNECOLOGY

## 2020-06-12 PROCEDURE — 86900 BLOOD TYPING SEROLOGIC ABO: CPT | Performed by: OBSTETRICS & GYNECOLOGY

## 2020-06-12 PROCEDURE — 88307 TISSUE EXAM BY PATHOLOGIST: CPT | Performed by: PATHOLOGY

## 2020-06-12 PROCEDURE — 86901 BLOOD TYPING SEROLOGIC RH(D): CPT | Performed by: OBSTETRICS & GYNECOLOGY

## 2020-06-12 RX ORDER — HYDROMORPHONE HCL/PF 1 MG/ML
0.5 SYRINGE (ML) INJECTION
Status: DISCONTINUED | OUTPATIENT
Start: 2020-06-12 | End: 2020-06-12 | Stop reason: HOSPADM

## 2020-06-12 RX ORDER — ONDANSETRON 2 MG/ML
INJECTION INTRAMUSCULAR; INTRAVENOUS AS NEEDED
Status: DISCONTINUED | OUTPATIENT
Start: 2020-06-12 | End: 2020-06-12 | Stop reason: SURG

## 2020-06-12 RX ORDER — SODIUM CHLORIDE, SODIUM LACTATE, POTASSIUM CHLORIDE, CALCIUM CHLORIDE 600; 310; 30; 20 MG/100ML; MG/100ML; MG/100ML; MG/100ML
125 INJECTION, SOLUTION INTRAVENOUS CONTINUOUS
Status: DISCONTINUED | OUTPATIENT
Start: 2020-06-12 | End: 2020-06-12 | Stop reason: HOSPADM

## 2020-06-12 RX ORDER — ALBUTEROL SULFATE 2.5 MG/3ML
2.5 SOLUTION RESPIRATORY (INHALATION) ONCE AS NEEDED
Status: DISCONTINUED | OUTPATIENT
Start: 2020-06-12 | End: 2020-06-12 | Stop reason: HOSPADM

## 2020-06-12 RX ORDER — LABETALOL 20 MG/4 ML (5 MG/ML) INTRAVENOUS SYRINGE
5
Status: DISCONTINUED | OUTPATIENT
Start: 2020-06-12 | End: 2020-06-12 | Stop reason: HOSPADM

## 2020-06-12 RX ORDER — LIDOCAINE HYDROCHLORIDE 10 MG/ML
0.5 INJECTION, SOLUTION EPIDURAL; INFILTRATION; INTRACAUDAL; PERINEURAL ONCE AS NEEDED
Status: DISCONTINUED | OUTPATIENT
Start: 2020-06-12 | End: 2020-06-12 | Stop reason: HOSPADM

## 2020-06-12 RX ORDER — DEXAMETHASONE SODIUM PHOSPHATE 10 MG/ML
INJECTION, SOLUTION INTRAMUSCULAR; INTRAVENOUS AS NEEDED
Status: DISCONTINUED | OUTPATIENT
Start: 2020-06-12 | End: 2020-06-12 | Stop reason: SURG

## 2020-06-12 RX ORDER — MIDAZOLAM HYDROCHLORIDE 2 MG/2ML
INJECTION, SOLUTION INTRAMUSCULAR; INTRAVENOUS AS NEEDED
Status: DISCONTINUED | OUTPATIENT
Start: 2020-06-12 | End: 2020-06-12 | Stop reason: SURG

## 2020-06-12 RX ORDER — FENTANYL CITRATE 50 UG/ML
INJECTION, SOLUTION INTRAMUSCULAR; INTRAVENOUS AS NEEDED
Status: DISCONTINUED | OUTPATIENT
Start: 2020-06-12 | End: 2020-06-12 | Stop reason: SURG

## 2020-06-12 RX ORDER — LIDOCAINE HYDROCHLORIDE 10 MG/ML
INJECTION, SOLUTION EPIDURAL; INFILTRATION; INTRACAUDAL; PERINEURAL AS NEEDED
Status: DISCONTINUED | OUTPATIENT
Start: 2020-06-12 | End: 2020-06-12 | Stop reason: SURG

## 2020-06-12 RX ORDER — PROPOFOL 10 MG/ML
INJECTION, EMULSION INTRAVENOUS AS NEEDED
Status: DISCONTINUED | OUTPATIENT
Start: 2020-06-12 | End: 2020-06-12 | Stop reason: SURG

## 2020-06-12 RX ORDER — ONDANSETRON 2 MG/ML
4 INJECTION INTRAMUSCULAR; INTRAVENOUS ONCE AS NEEDED
Status: COMPLETED | OUTPATIENT
Start: 2020-06-12 | End: 2020-06-12

## 2020-06-12 RX ORDER — PROMETHAZINE HYDROCHLORIDE 25 MG/ML
12.5 INJECTION, SOLUTION INTRAMUSCULAR; INTRAVENOUS ONCE AS NEEDED
Status: DISCONTINUED | OUTPATIENT
Start: 2020-06-12 | End: 2020-06-12 | Stop reason: HOSPADM

## 2020-06-12 RX ORDER — MEPERIDINE HYDROCHLORIDE 25 MG/ML
12.5 INJECTION INTRAMUSCULAR; INTRAVENOUS; SUBCUTANEOUS ONCE
Status: DISCONTINUED | OUTPATIENT
Start: 2020-06-12 | End: 2020-06-12 | Stop reason: HOSPADM

## 2020-06-12 RX ORDER — OXYCODONE HYDROCHLORIDE 5 MG/1
10 TABLET ORAL EVERY 4 HOURS PRN
Status: DISCONTINUED | OUTPATIENT
Start: 2020-06-12 | End: 2020-06-12 | Stop reason: HOSPADM

## 2020-06-12 RX ORDER — FENTANYL CITRATE/PF 50 MCG/ML
25 SYRINGE (ML) INJECTION
Status: DISCONTINUED | OUTPATIENT
Start: 2020-06-12 | End: 2020-06-12 | Stop reason: HOSPADM

## 2020-06-12 RX ORDER — KETOROLAC TROMETHAMINE 30 MG/ML
15 INJECTION, SOLUTION INTRAMUSCULAR; INTRAVENOUS EVERY 6 HOURS SCHEDULED
Status: DISCONTINUED | OUTPATIENT
Start: 2020-06-12 | End: 2020-06-12 | Stop reason: HOSPADM

## 2020-06-12 RX ORDER — ACETAMINOPHEN 325 MG/1
975 TABLET ORAL EVERY 6 HOURS SCHEDULED
Status: DISCONTINUED | OUTPATIENT
Start: 2020-06-12 | End: 2020-06-12 | Stop reason: HOSPADM

## 2020-06-12 RX ADMIN — MIDAZOLAM 2 MG: 1 INJECTION INTRAMUSCULAR; INTRAVENOUS at 16:39

## 2020-06-12 RX ADMIN — FENTANYL CITRATE 25 MCG: 50 INJECTION, SOLUTION INTRAMUSCULAR; INTRAVENOUS at 17:54

## 2020-06-12 RX ADMIN — FENTANYL CITRATE 25 MCG: 50 INJECTION, SOLUTION INTRAMUSCULAR; INTRAVENOUS at 18:04

## 2020-06-12 RX ADMIN — SODIUM CHLORIDE, SODIUM LACTATE, POTASSIUM CHLORIDE, AND CALCIUM CHLORIDE 125 ML/HR: .6; .31; .03; .02 INJECTION, SOLUTION INTRAVENOUS at 13:58

## 2020-06-12 RX ADMIN — ONDANSETRON 4 MG: 2 INJECTION INTRAMUSCULAR; INTRAVENOUS at 17:54

## 2020-06-12 RX ADMIN — FENTANYL CITRATE 25 MCG: 50 INJECTION, SOLUTION INTRAMUSCULAR; INTRAVENOUS at 17:59

## 2020-06-12 RX ADMIN — FENTANYL CITRATE 50 MCG: 50 INJECTION, SOLUTION INTRAMUSCULAR; INTRAVENOUS at 17:11

## 2020-06-12 RX ADMIN — ONDANSETRON 4 MG: 2 INJECTION INTRAMUSCULAR; INTRAVENOUS at 17:16

## 2020-06-12 RX ADMIN — LIDOCAINE HYDROCHLORIDE 50 MG: 10 INJECTION, SOLUTION EPIDURAL; INFILTRATION; INTRACAUDAL; PERINEURAL at 17:03

## 2020-06-12 RX ADMIN — SODIUM CHLORIDE, SODIUM LACTATE, POTASSIUM CHLORIDE, AND CALCIUM CHLORIDE: .6; .31; .03; .02 INJECTION, SOLUTION INTRAVENOUS at 16:33

## 2020-06-12 RX ADMIN — FENTANYL CITRATE 50 MCG: 50 INJECTION, SOLUTION INTRAMUSCULAR; INTRAVENOUS at 17:19

## 2020-06-12 RX ADMIN — DEXAMETHASONE SODIUM PHOSPHATE 10 MG: 10 INJECTION, SOLUTION INTRAMUSCULAR; INTRAVENOUS at 17:16

## 2020-06-12 RX ADMIN — PROPOFOL 200 MG: 10 INJECTION, EMULSION INTRAVENOUS at 17:03

## 2020-07-11 ENCOUNTER — HOSPITAL ENCOUNTER (EMERGENCY)
Facility: HOSPITAL | Age: 30
Discharge: HOME | End: 2020-07-11
Attending: EMERGENCY MEDICINE | Admitting: EMERGENCY MEDICINE
Payer: COMMERCIAL

## 2020-07-11 VITALS
RESPIRATION RATE: 16 BRPM | TEMPERATURE: 97.5 F | OXYGEN SATURATION: 99 % | HEART RATE: 93 BPM | WEIGHT: 139.33 LBS | DIASTOLIC BLOOD PRESSURE: 90 MMHG | SYSTOLIC BLOOD PRESSURE: 139 MMHG | BODY MASS INDEX: 24.68 KG/M2

## 2020-07-11 DIAGNOSIS — T24.231A: Primary | ICD-10-CM

## 2020-07-11 PROCEDURE — 99283 EMERGENCY DEPT VISIT LOW MDM: CPT | Performed by: EMERGENCY MEDICINE

## 2020-07-11 PROCEDURE — 99283 EMERGENCY DEPT VISIT LOW MDM: CPT

## 2020-07-11 RX ORDER — GINSENG 100 MG
4 CAPSULE ORAL ONCE
Status: COMPLETED | OUTPATIENT
Start: 2020-07-11 | End: 2020-07-11

## 2020-07-11 RX ADMIN — BACITRACIN ZINC 4 LARGE APPLICATION: 500 OINTMENT TOPICAL at 11:43
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
## 2020-07-20 ENCOUNTER — TELEPHONE (OUTPATIENT)
Dept: OBGYN CLINIC | Facility: CLINIC | Age: 30
End: 2020-07-20

## 2020-07-22 ENCOUNTER — TELEPHONE (OUTPATIENT)
Dept: OBGYN CLINIC | Facility: CLINIC | Age: 30
End: 2020-07-22

## 2020-08-06 ENCOUNTER — TELEPHONE (OUTPATIENT)
Dept: OBGYN CLINIC | Facility: CLINIC | Age: 30
End: 2020-08-06

## 2020-10-09 ENCOUNTER — TELEPHONE (OUTPATIENT)
Dept: OBGYN CLINIC | Facility: CLINIC | Age: 30
End: 2020-10-09

## 2020-10-21 ENCOUNTER — OFFICE VISIT (OUTPATIENT)
Dept: OBGYN CLINIC | Facility: CLINIC | Age: 30
End: 2020-10-21

## 2020-10-21 VITALS
WEIGHT: 134.8 LBS | HEART RATE: 72 BPM | TEMPERATURE: 97.5 F | BODY MASS INDEX: 23.88 KG/M2 | SYSTOLIC BLOOD PRESSURE: 144 MMHG | DIASTOLIC BLOOD PRESSURE: 92 MMHG

## 2020-10-21 DIAGNOSIS — R87.613 HGSIL ON CYTOLOGIC SMEAR OF CERVIX: ICD-10-CM

## 2020-10-21 DIAGNOSIS — Z30.42 ENCOUNTER FOR SURVEILLANCE OF INJECTABLE CONTRACEPTIVE: Primary | ICD-10-CM

## 2020-10-21 DIAGNOSIS — R03.0 ELEVATED BLOOD PRESSURE READING: ICD-10-CM

## 2020-10-21 DIAGNOSIS — Z30.09 GENERAL COUNSELING AND ADVICE ON FEMALE CONTRACEPTION: ICD-10-CM

## 2020-10-21 PROCEDURE — 96372 THER/PROPH/DIAG INJ SC/IM: CPT | Performed by: NURSE PRACTITIONER

## 2020-10-21 PROCEDURE — 99213 OFFICE O/P EST LOW 20 MIN: CPT | Performed by: NURSE PRACTITIONER

## 2020-10-21 PROCEDURE — 4004F PT TOBACCO SCREEN RCVD TLK: CPT | Performed by: NURSE PRACTITIONER

## 2020-10-21 RX ORDER — MEDROXYPROGESTERONE ACETATE 150 MG/ML
150 INJECTION, SUSPENSION INTRAMUSCULAR ONCE
Status: COMPLETED | OUTPATIENT
Start: 2020-10-21 | End: 2020-10-21

## 2020-10-21 RX ORDER — MEDROXYPROGESTERONE ACETATE 150 MG/ML
150 INJECTION, SUSPENSION INTRAMUSCULAR
Qty: 1 ML | Refills: 5 | Status: SHIPPED | OUTPATIENT
Start: 2020-10-21 | End: 2022-03-30 | Stop reason: SDUPTHER

## 2020-10-21 RX ORDER — MEDROXYPROGESTERONE ACETATE 150 MG/ML
150 INJECTION, SUSPENSION INTRAMUSCULAR
Qty: 1 ML | Refills: 5 | Status: SHIPPED | OUTPATIENT
Start: 2020-10-21 | End: 2020-10-21 | Stop reason: SDUPTHER

## 2020-10-21 RX ADMIN — MEDROXYPROGESTERONE ACETATE 150 MG: 150 INJECTION, SUSPENSION INTRAMUSCULAR at 11:55

## 2020-12-07 ENCOUNTER — OFFICE VISIT (OUTPATIENT)
Dept: FAMILY MEDICINE CLINIC | Facility: CLINIC | Age: 30
End: 2020-12-07

## 2020-12-07 VITALS
HEART RATE: 89 BPM | OXYGEN SATURATION: 98 % | HEIGHT: 63 IN | TEMPERATURE: 98.9 F | SYSTOLIC BLOOD PRESSURE: 122 MMHG | BODY MASS INDEX: 24.27 KG/M2 | DIASTOLIC BLOOD PRESSURE: 80 MMHG | WEIGHT: 137 LBS | RESPIRATION RATE: 18 BRPM

## 2020-12-07 DIAGNOSIS — R03.0 ELEVATED BLOOD PRESSURE READING: ICD-10-CM

## 2020-12-07 PROCEDURE — 3079F DIAST BP 80-89 MM HG: CPT | Performed by: INTERNAL MEDICINE

## 2020-12-07 PROCEDURE — 3074F SYST BP LT 130 MM HG: CPT | Performed by: INTERNAL MEDICINE

## 2020-12-07 PROCEDURE — 99213 OFFICE O/P EST LOW 20 MIN: CPT | Performed by: INTERNAL MEDICINE

## 2020-12-07 PROCEDURE — 3008F BODY MASS INDEX DOCD: CPT | Performed by: INTERNAL MEDICINE

## 2020-12-07 PROCEDURE — 4004F PT TOBACCO SCREEN RCVD TLK: CPT | Performed by: INTERNAL MEDICINE

## 2020-12-08 ENCOUNTER — TELEPHONE (OUTPATIENT)
Dept: FAMILY MEDICINE CLINIC | Facility: CLINIC | Age: 30
End: 2020-12-08

## 2020-12-14 ENCOUNTER — TELEPHONE (OUTPATIENT)
Dept: FAMILY MEDICINE CLINIC | Facility: CLINIC | Age: 30
End: 2020-12-14

## 2020-12-14 DIAGNOSIS — Z03.818 ENCOUNTER FOR OBSERVATION FOR SUSPECTED EXPOSURE TO OTHER BIOLOGICAL AGENTS RULED OUT: Primary | ICD-10-CM

## 2021-01-21 ENCOUNTER — TELEPHONE (OUTPATIENT)
Dept: OBGYN CLINIC | Facility: CLINIC | Age: 31
End: 2021-01-21

## 2021-01-21 ENCOUNTER — CLINICAL SUPPORT (OUTPATIENT)
Dept: OBGYN CLINIC | Facility: CLINIC | Age: 31
End: 2021-01-21

## 2021-01-21 VITALS
DIASTOLIC BLOOD PRESSURE: 80 MMHG | BODY MASS INDEX: 24.45 KG/M2 | SYSTOLIC BLOOD PRESSURE: 131 MMHG | HEIGHT: 63 IN | WEIGHT: 138 LBS | HEART RATE: 75 BPM

## 2021-01-21 DIAGNOSIS — Z30.09 UNWANTED FERTILITY: Primary | ICD-10-CM

## 2021-01-21 LAB — SL AMB POCT URINE HCG: NEGATIVE

## 2021-01-21 PROCEDURE — 81025 URINE PREGNANCY TEST: CPT

## 2021-01-21 PROCEDURE — 96372 THER/PROPH/DIAG INJ SC/IM: CPT

## 2021-01-21 PROCEDURE — 3008F BODY MASS INDEX DOCD: CPT | Performed by: INTERNAL MEDICINE

## 2021-01-21 RX ORDER — MEDROXYPROGESTERONE ACETATE 150 MG/ML
150 INJECTION, SUSPENSION INTRAMUSCULAR ONCE
Status: COMPLETED | OUTPATIENT
Start: 2021-01-21 | End: 2021-01-21

## 2021-01-21 RX ADMIN — MEDROXYPROGESTERONE ACETATE 150 MG: 150 INJECTION, SUSPENSION INTRAMUSCULAR at 14:02

## 2021-01-25 ENCOUNTER — TELEPHONE (OUTPATIENT)
Dept: OBGYN CLINIC | Facility: CLINIC | Age: 31
End: 2021-01-25

## 2021-01-25 NOTE — TELEPHONE ENCOUNTER
COVID Pre-Visit Screening     1  Is this a family member screening? No  2  Have you traveled outside of your state in the past 2 weeks? No  3  Do you presently have a fever or flu-like symptoms? No  4  Do you have symptoms of an upper respiratory infection like runny nose, sore throat, or cough? No  5  Are you suffering from new headache that you have not had in the past?  No  6  Do you have/have you experienced any new shortness of breath recently? No  7  Do you have any new diarrhea, nausea or vomiting? No  8  Have you been in contact with anyone who has been sick or diagnosed with COVID-19? No - Pt was tested 1/21/21 and NEGATIVE RESULTS  9  Do you have any new loss of taste or smell? No  10  Are you able to wear a mask without a valve for the entire visit?  Yes

## 2021-04-16 ENCOUNTER — CLINICAL SUPPORT (OUTPATIENT)
Dept: OBGYN CLINIC | Facility: CLINIC | Age: 31
End: 2021-04-16

## 2021-04-16 VITALS
HEART RATE: 89 BPM | SYSTOLIC BLOOD PRESSURE: 120 MMHG | DIASTOLIC BLOOD PRESSURE: 81 MMHG | BODY MASS INDEX: 24.45 KG/M2 | WEIGHT: 138 LBS

## 2021-04-16 DIAGNOSIS — Z30.42 ENCOUNTER FOR SURVEILLANCE OF INJECTABLE CONTRACEPTIVE: Primary | ICD-10-CM

## 2021-04-16 PROCEDURE — 96372 THER/PROPH/DIAG INJ SC/IM: CPT

## 2021-04-16 RX ORDER — MEDROXYPROGESTERONE ACETATE 150 MG/ML
150 INJECTION, SUSPENSION INTRAMUSCULAR ONCE
Status: COMPLETED | OUTPATIENT
Start: 2021-04-16 | End: 2021-04-16

## 2021-04-16 RX ADMIN — MEDROXYPROGESTERONE ACETATE 150 MG: 150 INJECTION, SUSPENSION INTRAMUSCULAR at 11:14

## 2021-04-28 ENCOUNTER — TELEPHONE (OUTPATIENT)
Dept: FAMILY MEDICINE CLINIC | Facility: CLINIC | Age: 31
End: 2021-04-28

## 2021-04-28 NOTE — TELEPHONE ENCOUNTER
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

## 2021-06-10 ENCOUNTER — ANNUAL EXAM (OUTPATIENT)
Dept: OBGYN CLINIC | Facility: CLINIC | Age: 31
End: 2021-06-10

## 2021-06-10 ENCOUNTER — PATIENT OUTREACH (OUTPATIENT)
Dept: OBGYN CLINIC | Facility: CLINIC | Age: 31
End: 2021-06-10

## 2021-06-10 VITALS
DIASTOLIC BLOOD PRESSURE: 82 MMHG | BODY MASS INDEX: 25.51 KG/M2 | SYSTOLIC BLOOD PRESSURE: 120 MMHG | HEART RATE: 87 BPM | WEIGHT: 144 LBS

## 2021-06-10 DIAGNOSIS — Z01.419 ENCOUNTER FOR GYNECOLOGICAL EXAMINATION WITHOUT ABNORMAL FINDING: Primary | ICD-10-CM

## 2021-06-10 DIAGNOSIS — Z12.39 ENCOUNTER FOR BREAST CANCER SCREENING USING NON-MAMMOGRAM MODALITY: ICD-10-CM

## 2021-06-10 DIAGNOSIS — Z12.4 SCREENING FOR CERVICAL CANCER: ICD-10-CM

## 2021-06-10 DIAGNOSIS — Z13.31 POSITIVE DEPRESSION SCREENING: ICD-10-CM

## 2021-06-10 DIAGNOSIS — Z11.3 SCREEN FOR STD (SEXUALLY TRANSMITTED DISEASE): ICD-10-CM

## 2021-06-10 PROBLEM — Z59.00 HOMELESS FAMILY: Status: RESOLVED | Noted: 2019-09-06 | Resolved: 2021-06-10

## 2021-06-10 PROBLEM — Z30.42 ENCOUNTER FOR SURVEILLANCE OF INJECTABLE CONTRACEPTIVE: Status: RESOLVED | Noted: 2020-10-21 | Resolved: 2021-06-10

## 2021-06-10 PROBLEM — Z30.09 GENERAL COUNSELING AND ADVICE ON FEMALE CONTRACEPTION: Status: RESOLVED | Noted: 2020-10-21 | Resolved: 2021-06-10

## 2021-06-10 PROBLEM — F33.1 MODERATE EPISODE OF RECURRENT MAJOR DEPRESSIVE DISORDER (HCC): Status: RESOLVED | Noted: 2019-09-06 | Resolved: 2021-06-10

## 2021-06-10 PROBLEM — Z02.4 ENCOUNTER FOR DRIVER'S LICENSE HISTORY AND PHYSICAL: Status: RESOLVED | Noted: 2019-03-28 | Resolved: 2021-06-10

## 2021-06-10 PROBLEM — R03.0 ELEVATED BLOOD PRESSURE READING: Status: RESOLVED | Noted: 2020-06-03 | Resolved: 2021-06-10

## 2021-06-10 PROBLEM — Z62.810 HISTORY OF SEXUAL ABUSE IN CHILDHOOD: Status: RESOLVED | Noted: 2019-09-06 | Resolved: 2021-06-10

## 2021-06-10 PROBLEM — R87.613 HGSIL ON CYTOLOGIC SMEAR OF CERVIX: Status: RESOLVED | Noted: 2020-05-19 | Resolved: 2021-06-10

## 2021-06-10 PROCEDURE — G0145 SCR C/V CYTO,THINLAYER,RESCR: HCPCS | Performed by: NURSE PRACTITIONER

## 2021-06-10 PROCEDURE — 87624 HPV HI-RISK TYP POOLED RSLT: CPT | Performed by: NURSE PRACTITIONER

## 2021-06-10 PROCEDURE — 4004F PT TOBACCO SCREEN RCVD TLK: CPT | Performed by: NURSE PRACTITIONER

## 2021-06-10 PROCEDURE — 99395 PREV VISIT EST AGE 18-39: CPT | Performed by: NURSE PRACTITIONER

## 2021-06-10 PROCEDURE — 87491 CHLMYD TRACH DNA AMP PROBE: CPT | Performed by: NURSE PRACTITIONER

## 2021-06-10 PROCEDURE — 3725F SCREEN DEPRESSION PERFORMED: CPT | Performed by: NURSE PRACTITIONER

## 2021-06-10 PROCEDURE — 87591 N.GONORRHOEAE DNA AMP PROB: CPT | Performed by: NURSE PRACTITIONER

## 2021-06-10 NOTE — PROGRESS NOTES
ANNUAL GYNECOLOGICAL EXAMINATION    Deepa Bal is a 26 y o  female who presents today for annual GYN exam   Her last pap smear was performed 7/15/2019 and result was HSIL  She had LEEP performed 2020  She reports menses as absent due to [address] contraception  Her general medical history has been reviewed and she reports it as follows:    Past Medical History:   Diagnosis Date    Abnormal Pap smear of cervix      HSIL pap, colpo CIN3; LEEP 2020    Chlamydia 2016    Depression     no meds    Fibromyalgia     no meds     Past Surgical History:   Procedure Laterality Date    KS COLPOSCOPY,CERVIX W/ADJ VAG,W/LOOP BX N/A 2020    Procedure: BIOPSY LEEP CERVIX;  Surgeon: Michelle Dodd MD;  Location: [facility];  Service: Gynecology    WISDOM TOOTH EXTRACTION       OB History        3    Para   3    Term   2       1    AB   0    Living   3       SAB   0    TAB   0    Ectopic   0    Multiple   0    Live Births   3               Social History     Tobacco Use    Smoking status: Current Every Day Smoker     Packs/day: 0 25     Types: Cigarettes    Smokeless tobacco: Current User   Substance Use Topics    Alcohol use: Yes     Frequency: 2-4 times a month     Drinks per session: 1 or 2    Drug use: Yes     Types: Marijuana     Comment: intermittent use     Cancer-related family history includes Cancer in her maternal uncle  There is no history of Breast cancer, Colon cancer, or Ovarian cancer  Current Outpatient Medications   Medication Instructions    medroxyPROGESTERone (DEPO-PROVERA) 150 mg, Intramuscular, Every 3 months       Review of Systems:  Review of Systems   Constitutional: Negative  Gastrointestinal: Negative  Genitourinary: Negative for difficulty urinating, menstrual problem, pelvic pain and vaginal discharge  Skin: Negative          Physical Exam:  /82   Pulse 87   Wt 65 3 kg (144 lb)   BMI 25 51 kg/m²   Physical Exam  Constitutional: General: She is not in acute distress  Appearance: She is well-developed  Genitourinary:      Vulva, vagina and uterus normal       No lesions in the vagina  No cervical motion tenderness or lesion  Uterus is not tender  No right or left adnexal mass present  Right adnexa not tender  Left adnexa not tender  Neck:      Musculoskeletal: Neck supple  Thyroid: No thyromegaly  Cardiovascular:      Rate and Rhythm: Normal rate and regular rhythm  Pulmonary:      Effort: Pulmonary effort is normal    Chest:      Breasts:         Right: No mass, nipple discharge, skin change or tenderness  Left: No mass, nipple discharge, skin change or tenderness  Abdominal:      Palpations: Abdomen is soft  Tenderness: There is no abdominal tenderness  Neurological:      Mental Status: She is alert and oriented to person, place, and time  Skin:     General: Skin is warm and dry  Vitals signs reviewed  Assessment/Plan:   1  Normal well-woman GYN exam   2  Cervical cancer screening:  Normal cervical exam   Pap smear done with HPV co-testing  3  STD screening:  Orders placed for vaginal GC/CT cultures  Orders placed for serum anti-HIV, anti-HCV, HbsAg, RPR    4  Breast cancer screening:  Normal breast exam   Reviewed breast self-awareness  5  Depression Screening: Patient's depression screening was assessed with a PHQ-2 score of 3  Their PHQ-9 score was 14  Patient advised to follow-up with PCP for further management  Referral placed for  consultation  6  BMI Counseling: Body mass index is 25 51 kg/m²  No intervention indicated  7  Tobacco Cessation Counseling: Tobacco cessation counseling and education was provided  The patient is sincerely urged to quit consumption of tobacco  She is not ready to quit tobacco  The numerous health risks of tobacco consumption were discussed   If she decides to quit, there are a number of helpful adjunctive aids, and she can see me to discuss nicotine replacement therapy, chantix, or bupropion anytime in the future  8  Contraception:  Depoprovera injection every 3 months     9  Return to office in 1 year for annual GYN exam

## 2021-06-10 NOTE — LETTER
2021    To Emerald Aguirre  : 1990      This letter is to advise you that your recent PAP SMEAR results were reviewed by me and are NORMAL    We will see you in 1 year for your annual exam     JAY Posadas

## 2021-06-10 NOTE — LETTER
9/15/2021    To Emerald Aguirre  : 1990      This letter is to advise you that your recent BLOODWORK results were reviewed by me and are NORMAL  Please contact our office for an appointment if you have any additional concerns      JAY Foster

## 2021-06-10 NOTE — LETTER
6/15/2021    To Emerald Aguirre  : 1990      This letter is to advise you that your recent CULTURE results were reviewed by me and are NORMAL  Please contact the office for an appointment if you have any additional concerns      JAY Morales

## 2021-06-10 NOTE — PATIENT INSTRUCTIONS
Cigarette Smoking and Your Health     What are the risks to my health if I smoke tobacco?  Nicotine and other chemicals found in tobacco damage every cell in your body  Even if you are a light smoker, you have an increased risk for cancer, heart disease, and lung disease  If you are pregnant or have diabetes, smoking increases your risk for complications  What are the benefits to my health if I stop smoking? · You decrease respiratory symptoms such as coughing, wheezing, and shortness of breath  · You reduce your risk for cancers of the lung, mouth, throat, kidney, bladder, pancreas, stomach, and cervix  If you already have cancer, you increase the benefits of chemotherapy  You also reduce your risk for cancer returning or a second cancer from developing  · You reduce your risk for heart disease, blood clots, heart attack, and stroke  · You reduce your risk for lung infections, and diseases such as pneumonia, asthma, chronic bronchitis, and emphysema  · Your circulation improves  More oxygen can be delivered to your body  If you have diabetes, you lower your risk for complications, such as kidney, artery, and eye diseases  You also lower your risk for nerve damage  Nerve damage can lead to amputations, poor vision, and blindness  · You improve your body's ability to heal and to fight infections  What are the health benefits to others if I stop smoking? Tobacco is harmful to nonsmokers who breathe in your secondhand smoke  The following are ways the health of others around you may improve when you stop smoking:  · You lower the risks for lung cancer and heart disease in nonsmoking adults  · If you are pregnant, you lower the risk for miscarriage, early delivery, low birth weight, and stillbirth  You also lower your baby's risk for SIDS, obesity, developmental delay, and neurobehavioral problems, such as ADHD       · If you have children, you lower their risk for ear infections, colds, pneumonia, bronchitis, and asthma  How to Stop Smoking     You will improve your health and the health of others around you  if you stop smoking  Your risk for heart and lung disease, cancer, stroke, heart attack, and vision problems will also decrease  You can benefit from quitting no matter how long you have smoked  PREPARE to stop smoking  Nicotine is a highly addictive drug found in cigarettes  Withdrawal symptoms can happen when you stop smoking and make it hard to quit  These include anxiety, depression, irritability, trouble sleeping, and increased appetite  You increase your chances of success if you PREPARE to quit  · Set a quit date  Dolores Balloon a date that is within the next 2 weeks  Do not pick a day that you think may be stressful or busy  Write down the day or [place] it on your calender  · Tell friends and family that you plan to quit  Explain that you may have withdrawal symptoms when you try to quit  Ask them to support you  They may be able to encourage you and help reduce your stress to make it easier for you to quit  · Make a list of your reasons for quitting  Put the list somewhere you will see it every day, such as your refrigerator  You can look at the list when you have a craving  · Remove all tobacco and nicotine products from your home, car, and workplace  Also, remove anything else that will tempt you to smoke, such as lighters, matches, or ashtrays  Clean your car, home, and places at work that smell like smoke  The smell of smoke can trigger a craving  · Identify triggers that make you want to smoke  This may include activities, feelings, or people  Also write down 1 way you can deal with each of your triggers  For example, if you want to smoke as soon as you wake up, plan another activity during this time, such as exercise  · Make a plan for how you will quit    Learn about the tools that can help you quit, such as medicine, counseling, or nicotine replacement therapy  Choose at least 2 options to help you quit  Tools to help you stop smoking:     · Counseling  from a trained healthcare provider can provide you with support and skills to quit smoking  The provider will also teach you to manage your withdrawal symptoms and cravings  You may receive counseling from one counselor, in group therapy, or through phone therapy called a quit line  · Nicotine replacement therapy (NRT)  such as nicotine patches, gum, or lozenges may help reduce your nicotine cravings  You may get these without a doctor's order  Do not use e-cigarettes or smokeless tobacco in place of cigarettes or to help you quit  They still contain nicotine  · Prescription medicines  such as nasal sprays or nicotine inhalers may help reduce your withdrawal symptoms  Other medicines may also be used to reduce your urge to smoke  Ask your healthcare provider about these medicines  You may need to start certain medicines 2 weeks before your quit date for them to work well  · Hypnosis  is a practice that helps guide you through thoughts and feelings  Hypnosis may help decrease your cravings and make you more willing to quit  · Acupuncture therapy  uses very thin needles to balance energy channels in the body  This is thought to help decrease cravings and symptoms of nicotine withdrawal      · Support groups  let you talk to others who are trying to quit or have already quit  It may be helpful to speak with others about how they quit  Manage your cravings:     · Avoid situations, people, and places that tempt you to smoke  Go to nonsmoking places, such as libraries or restaurants  Understand what tempts you and try to avoid these things  · Keep your hands busy  Hold things such as a stress ball or pen  · Put candy or toothpicks in your mouth  Keep lollipops, sugarless gum, or toothpicks with you at all times  · Do not have alcohol or caffeine    These drinks may tempt you to smoke  Drink healthy liquids such as water or juice instead  · Reward yourself when you resist your cravings  Rewards will motivate you and help you stay positive  · Do an activity that distracts you from your craving  Examples include going for a walk, exercising, or cleaning  Prevent weight gain after you quit:  You may gain a few pounds after you quit smoking  It is healthier for you to gain a few pounds than to continue to smoke  The following can help you prevent weight gain:    · Eat healthy foods  These include fruits, vegetables, whole-grain breads, low-fat dairy products, beans, lean meats, and fish  Eat healthy snacks, such as low-fat yogurt, if you get hungry between meals  · Drink water before, during, and between meals  This will make your stomach feel full and help prevent you from overeating  Ask your healthcare provider how much liquid to drink each day and which liquids are best for you  · Exercise  Take a walk or do some kind of exercise every day  Ask your healthcare provider what exercise is right for you  This may help reduce your cravings and reduce stress

## 2021-06-11 ENCOUNTER — PATIENT OUTREACH (OUTPATIENT)
Dept: OBGYN CLINIC | Facility: CLINIC | Age: 31
End: 2021-06-11

## 2021-06-15 LAB
C TRACH DNA SPEC QL NAA+PROBE: NEGATIVE
HPV HR 12 DNA CVX QL NAA+PROBE: NEGATIVE
HPV16 DNA CVX QL NAA+PROBE: NEGATIVE
HPV18 DNA CVX QL NAA+PROBE: NEGATIVE
N GONORRHOEA DNA SPEC QL NAA+PROBE: NEGATIVE

## 2021-06-17 LAB
LAB AP GYN PRIMARY INTERPRETATION: NORMAL
Lab: NORMAL
PATH INTERP SPEC-IMP: NORMAL

## 2021-09-13 ENCOUNTER — APPOINTMENT (OUTPATIENT)
Dept: LAB | Facility: HOSPITAL | Age: 31
End: 2021-09-13
Attending: NURSE PRACTITIONER
Payer: COMMERCIAL

## 2021-09-13 DIAGNOSIS — Z11.3 SCREEN FOR STD (SEXUALLY TRANSMITTED DISEASE): ICD-10-CM

## 2021-09-13 LAB
HBV SURFACE AG SER QL: NORMAL
HCV AB SER QL: NORMAL

## 2021-09-13 PROCEDURE — 87389 HIV-1 AG W/HIV-1&-2 AB AG IA: CPT

## 2021-09-13 PROCEDURE — 86592 SYPHILIS TEST NON-TREP QUAL: CPT

## 2021-09-13 PROCEDURE — 87340 HEPATITIS B SURFACE AG IA: CPT

## 2021-09-13 PROCEDURE — 86803 HEPATITIS C AB TEST: CPT

## 2021-09-13 PROCEDURE — 36415 COLL VENOUS BLD VENIPUNCTURE: CPT

## 2021-09-14 ENCOUNTER — TELEPHONE (OUTPATIENT)
Dept: FAMILY MEDICINE CLINIC | Facility: CLINIC | Age: 31
End: 2021-09-14

## 2021-09-14 LAB — RPR SER QL: NORMAL

## 2021-09-15 LAB — HIV 1+2 AB+HIV1 P24 AG SERPL QL IA: NORMAL

## 2021-10-29 ENCOUNTER — OFFICE VISIT (OUTPATIENT)
Dept: FAMILY MEDICINE CLINIC | Facility: CLINIC | Age: 31
End: 2021-10-29

## 2021-10-29 VITALS
WEIGHT: 145.6 LBS | OXYGEN SATURATION: 99 % | HEIGHT: 63 IN | HEART RATE: 88 BPM | TEMPERATURE: 98.7 F | DIASTOLIC BLOOD PRESSURE: 102 MMHG | RESPIRATION RATE: 18 BRPM | BODY MASS INDEX: 25.8 KG/M2 | SYSTOLIC BLOOD PRESSURE: 138 MMHG

## 2021-10-29 DIAGNOSIS — Z02.4 ENCOUNTER FOR DRIVER'S LICENSE HISTORY AND PHYSICAL: Primary | ICD-10-CM

## 2021-10-29 DIAGNOSIS — R11.0 NAUSEA: ICD-10-CM

## 2021-10-29 DIAGNOSIS — Z72.0 TOBACCO ABUSE: ICD-10-CM

## 2021-10-29 PROCEDURE — 99213 OFFICE O/P EST LOW 20 MIN: CPT | Performed by: FAMILY MEDICINE

## 2021-10-29 RX ORDER — ONDANSETRON 4 MG/1
4 TABLET, ORALLY DISINTEGRATING ORAL EVERY 6 HOURS PRN
Qty: 20 TABLET | Refills: 0 | Status: SHIPPED | OUTPATIENT
Start: 2021-10-29 | End: 2022-06-17

## 2021-11-02 ENCOUNTER — HOSPITAL ENCOUNTER (EMERGENCY)
Facility: HOSPITAL | Age: 31
Discharge: HOME | End: 2021-11-02
Attending: EMERGENCY MEDICINE | Admitting: EMERGENCY MEDICINE
Payer: COMMERCIAL

## 2021-11-02 VITALS
HEART RATE: 84 BPM | DIASTOLIC BLOOD PRESSURE: 119 MMHG | TEMPERATURE: 97.7 F | OXYGEN SATURATION: 99 % | RESPIRATION RATE: 18 BRPM | SYSTOLIC BLOOD PRESSURE: 160 MMHG

## 2021-11-02 DIAGNOSIS — M25.511 ACUTE PAIN OF RIGHT SHOULDER: Primary | ICD-10-CM

## 2021-11-02 DIAGNOSIS — M54.6 THORACIC SPINE PAIN: ICD-10-CM

## 2021-11-02 PROCEDURE — 99283 EMERGENCY DEPT VISIT LOW MDM: CPT

## 2021-11-02 PROCEDURE — 99284 EMERGENCY DEPT VISIT MOD MDM: CPT | Performed by: EMERGENCY MEDICINE

## 2021-11-02 RX ORDER — METHOCARBAMOL 500 MG/1
1500 TABLET, FILM COATED ORAL ONCE
Status: COMPLETED | OUTPATIENT
Start: 2021-11-02 | End: 2021-11-02

## 2021-11-02 RX ORDER — ACETAMINOPHEN 325 MG/1
650 TABLET ORAL ONCE
Status: COMPLETED | OUTPATIENT
Start: 2021-11-02 | End: 2021-11-02

## 2021-11-02 RX ORDER — METHOCARBAMOL 500 MG/1
500 TABLET, FILM COATED ORAL 2 TIMES DAILY
Qty: 20 TABLET | Refills: 0 | Status: SHIPPED | OUTPATIENT
Start: 2021-11-02 | End: 2022-06-17

## 2021-11-02 RX ADMIN — ACETAMINOPHEN 650 MG: 325 TABLET ORAL at 07:41

## 2021-11-02 RX ADMIN — METHOCARBAMOL TABLETS 1500 MG: 500 TABLET, COATED ORAL at 07:41

## 2021-12-10 ENCOUNTER — TELEPHONE (OUTPATIENT)
Dept: OBGYN CLINIC | Facility: CLINIC | Age: 31
End: 2021-12-10

## 2022-01-14 DIAGNOSIS — Z11.59 SCREENING FOR VIRAL DISEASE: Primary | ICD-10-CM

## 2022-01-14 PROCEDURE — U0003 INFECTIOUS AGENT DETECTION BY NUCLEIC ACID (DNA OR RNA); SEVERE ACUTE RESPIRATORY SYNDROME CORONAVIRUS 2 (SARS-COV-2) (CORONAVIRUS DISEASE [COVID-19]), AMPLIFIED PROBE TECHNIQUE, MAKING USE OF HIGH THROUGHPUT TECHNOLOGIES AS DESCRIBED BY CMS-2020-01-R: HCPCS | Performed by: STUDENT IN AN ORGANIZED HEALTH CARE EDUCATION/TRAINING PROGRAM

## 2022-01-14 PROCEDURE — U0005 INFEC AGEN DETEC AMPLI PROBE: HCPCS | Performed by: STUDENT IN AN ORGANIZED HEALTH CARE EDUCATION/TRAINING PROGRAM

## 2022-03-08 ENCOUNTER — HOSPITAL ENCOUNTER (EMERGENCY)
Facility: HOSPITAL | Age: 32
Discharge: HOME | End: 2022-03-09
Attending: EMERGENCY MEDICINE
Payer: MEDICARE

## 2022-03-08 VITALS
BODY MASS INDEX: 24.33 KG/M2 | TEMPERATURE: 98.4 F | HEART RATE: 72 BPM | OXYGEN SATURATION: 98 % | RESPIRATION RATE: 19 BRPM | WEIGHT: 137.35 LBS | DIASTOLIC BLOOD PRESSURE: 87 MMHG | SYSTOLIC BLOOD PRESSURE: 136 MMHG

## 2022-03-08 DIAGNOSIS — R41.82 ALTERED MENTAL STATUS: Primary | ICD-10-CM

## 2022-03-08 PROCEDURE — 93005 ELECTROCARDIOGRAM TRACING: CPT

## 2022-03-08 PROCEDURE — 99285 EMERGENCY DEPT VISIT HI MDM: CPT | Performed by: EMERGENCY MEDICINE

## 2022-03-08 PROCEDURE — 99285 EMERGENCY DEPT VISIT HI MDM: CPT

## 2022-03-09 ENCOUNTER — APPOINTMENT (EMERGENCY)
Dept: CT IMAGING | Facility: HOSPITAL | Age: 32
End: 2022-03-09
Payer: MEDICARE

## 2022-03-09 LAB
ALBUMIN SERPL BCP-MCNC: 4 G/DL (ref 3.5–5)
ALP SERPL-CCNC: 62 U/L (ref 46–116)
ALT SERPL W P-5'-P-CCNC: 25 U/L (ref 12–78)
ANION GAP SERPL CALCULATED.3IONS-SCNC: 7 MMOL/L (ref 4–13)
APAP SERPL-MCNC: <2 UG/ML (ref 10–20)
AST SERPL W P-5'-P-CCNC: 23 U/L (ref 5–45)
ATRIAL RATE: 72 BPM
BILIRUB SERPL-MCNC: 0.26 MG/DL (ref 0.2–1)
BUN SERPL-MCNC: 10 MG/DL (ref 5–25)
CALCIUM SERPL-MCNC: 9 MG/DL (ref 8.3–10.1)
CHLORIDE SERPL-SCNC: 105 MMOL/L (ref 100–108)
CO2 SERPL-SCNC: 31 MMOL/L (ref 21–32)
CREAT SERPL-MCNC: 0.74 MG/DL (ref 0.6–1.3)
ERYTHROCYTE [DISTWIDTH] IN BLOOD BY AUTOMATED COUNT: 12.5 % (ref 11.6–15.1)
ETHANOL SERPL-MCNC: <3 MG/DL (ref 0–3)
GFR SERPL CREATININE-BSD FRML MDRD: 106 ML/MIN/1.73SQ M
GLUCOSE SERPL-MCNC: 104 MG/DL (ref 65–140)
HCG SERPL QL: NEGATIVE
HCT VFR BLD AUTO: 38.2 % (ref 34.8–46.1)
HGB BLD-MCNC: 12.6 G/DL (ref 11.5–15.4)
MCH RBC QN AUTO: 32.1 PG (ref 26.8–34.3)
MCHC RBC AUTO-ENTMCNC: 33 G/DL (ref 31.4–37.4)
MCV RBC AUTO: 97 FL (ref 82–98)
P AXIS: -22 DEGREES
PLATELET # BLD AUTO: 298 THOUSANDS/UL (ref 149–390)
PMV BLD AUTO: 9.1 FL (ref 8.9–12.7)
POTASSIUM SERPL-SCNC: 4.1 MMOL/L (ref 3.5–5.3)
PR INTERVAL: 132 MS
PROT SERPL-MCNC: 7 G/DL (ref 6.4–8.2)
QRS AXIS: 27 DEGREES
QRSD INTERVAL: 78 MS
QT INTERVAL: 368 MS
QTC INTERVAL: 402 MS
RBC # BLD AUTO: 3.92 MILLION/UL (ref 3.81–5.12)
SALICYLATES SERPL-MCNC: 4 MG/DL (ref 3–20)
SODIUM SERPL-SCNC: 143 MMOL/L (ref 136–145)
T WAVE AXIS: 28 DEGREES
VENTRICULAR RATE: 72 BPM
WBC # BLD AUTO: 7.85 THOUSAND/UL (ref 4.31–10.16)

## 2022-03-09 PROCEDURE — G1004 CDSM NDSC: HCPCS

## 2022-03-09 PROCEDURE — 36415 COLL VENOUS BLD VENIPUNCTURE: CPT | Performed by: EMERGENCY MEDICINE

## 2022-03-09 PROCEDURE — 80053 COMPREHEN METABOLIC PANEL: CPT | Performed by: EMERGENCY MEDICINE

## 2022-03-09 PROCEDURE — 82077 ASSAY SPEC XCP UR&BREATH IA: CPT | Performed by: EMERGENCY MEDICINE

## 2022-03-09 PROCEDURE — 84703 CHORIONIC GONADOTROPIN ASSAY: CPT | Performed by: EMERGENCY MEDICINE

## 2022-03-09 PROCEDURE — 80179 DRUG ASSAY SALICYLATE: CPT | Performed by: EMERGENCY MEDICINE

## 2022-03-09 PROCEDURE — 70450 CT HEAD/BRAIN W/O DYE: CPT

## 2022-03-09 PROCEDURE — 85027 COMPLETE CBC AUTOMATED: CPT | Performed by: EMERGENCY MEDICINE

## 2022-03-09 PROCEDURE — 93010 ELECTROCARDIOGRAM REPORT: CPT | Performed by: INTERNAL MEDICINE

## 2022-03-09 PROCEDURE — 80143 DRUG ASSAY ACETAMINOPHEN: CPT | Performed by: EMERGENCY MEDICINE
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
## 2022-03-09 NOTE — DISCHARGE INSTRUCTIONS
You have been seen for a change in mental status  Please abstain from illicit drug use  Return to the emergency department if you develop worsening confusion, weakness/numbness/tingling or any other symptoms of concern  Please follow up with your PCP by calling the number provided

## 2022-03-09 NOTE — ED NOTES
Pt ripped off leads and repeatedly is attempting to leave  Pt not easily redirected as she gets confused and falls asleep mid sentence  Provider aware        Mook Abebe RN  03/08/22 7842

## 2022-03-11 NOTE — ED ATTENDING ATTESTATION
3/8/2022  I, Stevenson Gilford, MD, saw and evaluated the patient  I have discussed the patient with the resident/non-physician practitioner and agree with the resident's/non-physician practitioner's findings, Plan of Care, and MDM as documented in the resident's/non-physician practitioner's note, except where noted  All available labs and Radiology studies were reviewed  I was present for key portions of any procedure(s) performed by the resident/non-physician practitioner and I was immediately available to provide assistance  At this point I agree with the current assessment done in the Emergency Department  I have conducted an independent evaluation of this patient a history and physical is as follows:    32 YO female presents for possible overdose  Pt works in a care facility, 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, seemed confused on arousal and falling back to sleep quickly  Concern for using inhalants as a can of whip cream was with her  Pt denies CP/SOB/F/C/N/V/D/C, no dysuria, burning on urination or blood in urine  Gen: Pt is in NAD  HEENT: Head is atraumatic, EOM's intact, neck has FROM  Chest: CTAB, non-tender  Heart: RRR  Abdomen: Soft, NT/ND  Musculoskeletal: FROM in all extremities  Skin: No rash, no ecchymosis  Neuro: Awake, alert, slurring speech, confused; Cranial nerves II-XII intact  Psych: Normal affect    MDM -  Pt with possible overdose, in no distress  Will observe, CT head, see if Pt can be picked up and discuss Pt's baseline with family      ED Course         Critical Care Time  Procedures

## 2022-03-30 DIAGNOSIS — Z30.42 ENCOUNTER FOR SURVEILLANCE OF INJECTABLE CONTRACEPTIVE: ICD-10-CM

## 2022-04-01 RX ORDER — MEDROXYPROGESTERONE ACETATE 150 MG/ML
150 INJECTION, SUSPENSION INTRAMUSCULAR
Qty: 1 ML | Refills: 0 | Status: SHIPPED | OUTPATIENT
Start: 2022-04-01 | End: 2022-06-17

## 2022-04-05 ENCOUNTER — TELEPHONE (OUTPATIENT)
Dept: OBGYN CLINIC | Facility: CLINIC | Age: 32
End: 2022-04-05

## 2022-04-07 ENCOUNTER — TELEPHONE (OUTPATIENT)
Dept: OBGYN CLINIC | Facility: CLINIC | Age: 32
End: 2022-04-07

## 2022-04-20 ENCOUNTER — TELEPHONE (OUTPATIENT)
Dept: OBGYN CLINIC | Facility: CLINIC | Age: 32
End: 2022-04-20

## 2022-06-17 ENCOUNTER — PATIENT OUTREACH (OUTPATIENT)
Dept: OBGYN CLINIC | Facility: CLINIC | Age: 32
End: 2022-06-17

## 2022-06-17 ENCOUNTER — ANNUAL EXAM (OUTPATIENT)
Dept: OBGYN CLINIC | Facility: CLINIC | Age: 32
End: 2022-06-17

## 2022-06-17 ENCOUNTER — APPOINTMENT (OUTPATIENT)
Dept: LAB | Facility: HOSPITAL | Age: 32
End: 2022-06-17
Attending: NURSE PRACTITIONER
Payer: MEDICARE

## 2022-06-17 VITALS
DIASTOLIC BLOOD PRESSURE: 101 MMHG | SYSTOLIC BLOOD PRESSURE: 155 MMHG | HEART RATE: 85 BPM | WEIGHT: 146.4 LBS | BODY MASS INDEX: 25.93 KG/M2

## 2022-06-17 DIAGNOSIS — Z12.39 ENCOUNTER FOR BREAST CANCER SCREENING USING NON-MAMMOGRAM MODALITY: ICD-10-CM

## 2022-06-17 DIAGNOSIS — Z12.4 SCREENING FOR CERVICAL CANCER: ICD-10-CM

## 2022-06-17 DIAGNOSIS — Z01.419 ENCOUNTER FOR GYNECOLOGICAL EXAMINATION WITHOUT ABNORMAL FINDING: Primary | ICD-10-CM

## 2022-06-17 DIAGNOSIS — Z13.31 POSITIVE DEPRESSION SCREENING: ICD-10-CM

## 2022-06-17 DIAGNOSIS — Z30.09 UNWANTED FERTILITY: ICD-10-CM

## 2022-06-17 DIAGNOSIS — Z11.3 SCREEN FOR STD (SEXUALLY TRANSMITTED DISEASE): ICD-10-CM

## 2022-06-17 LAB
B-HCG SERPL-ACNC: <3 MIU/ML
HBV SURFACE AG SER QL: NORMAL
HCV AB SER QL: NORMAL

## 2022-06-17 PROCEDURE — 86592 SYPHILIS TEST NON-TREP QUAL: CPT

## 2022-06-17 PROCEDURE — 86803 HEPATITIS C AB TEST: CPT

## 2022-06-17 PROCEDURE — 84702 CHORIONIC GONADOTROPIN TEST: CPT

## 2022-06-17 PROCEDURE — 87389 HIV-1 AG W/HIV-1&-2 AB AG IA: CPT

## 2022-06-17 PROCEDURE — 36415 COLL VENOUS BLD VENIPUNCTURE: CPT

## 2022-06-17 PROCEDURE — 87491 CHLMYD TRACH DNA AMP PROBE: CPT | Performed by: NURSE PRACTITIONER

## 2022-06-17 PROCEDURE — 87340 HEPATITIS B SURFACE AG IA: CPT

## 2022-06-17 PROCEDURE — 87591 N.GONORRHOEAE DNA AMP PROB: CPT | Performed by: NURSE PRACTITIONER

## 2022-06-17 PROCEDURE — 99395 PREV VISIT EST AGE 18-39: CPT | Performed by: NURSE PRACTITIONER

## 2022-06-17 RX ORDER — MEDROXYPROGESTERONE ACETATE 150 MG/ML
150 INJECTION, SUSPENSION INTRAMUSCULAR
Qty: 1 ML | Refills: 0 | Status: SHIPPED | OUTPATIENT
Start: 2022-06-17

## 2022-06-17 NOTE — PATIENT INSTRUCTIONS
Thank you for your confidence in our team    We appreciate you and welcome your feedback  If you receive a survey from us, please take a few moments to let us know how we are doing  Sincerely,  JAY García         Cigarette Smoking and Your Health     What are the risks to my health if I smoke tobacco?  Nicotine and other chemicals found in tobacco damage every cell in your body  Even if you are a light smoker, you have an increased risk for cancer, heart disease, and lung disease  If you are pregnant or have diabetes, smoking increases your risk for complications  What are the benefits to my health if I stop smoking? You decrease respiratory symptoms such as coughing, wheezing, and shortness of breath  You reduce your risk for cancers of the lung, mouth, throat, kidney, bladder, pancreas, stomach, and cervix  If you already have cancer, you increase the benefits of chemotherapy  You also reduce your risk for cancer returning or a second cancer from developing  You reduce your risk for heart disease, blood clots, heart attack, and stroke  You reduce your risk for lung infections, and diseases such as pneumonia, asthma, chronic bronchitis, and emphysema  Your circulation improves  More oxygen can be delivered to your body  If you have diabetes, you lower your risk for complications, such as kidney, artery, and eye diseases  You also lower your risk for nerve damage  Nerve damage can lead to amputations, poor vision, and blindness  You improve your body's ability to heal and to fight infections  What are the health benefits to others if I stop smoking? Tobacco is harmful to nonsmokers who breathe in your secondhand smoke  The following are ways the health of others around you may improve when you stop smoking: You lower the risks for lung cancer and heart disease in nonsmoking adults       If you are pregnant, you lower the risk for miscarriage, early delivery, low birth weight, and stillbirth  You also lower your baby's risk for SIDS, obesity, developmental delay, and neurobehavioral problems, such as ADHD  If you have children, you lower their risk for ear infections, colds, pneumonia, bronchitis, and asthma  How to Stop Smoking     You will improve your health and the health of others around you  if you stop smoking  Your risk for heart and lung disease, cancer, stroke, heart attack, and vision problems will also decrease  You can benefit from quitting no matter how long you have smoked  PREPARE to stop smoking  Nicotine is a highly addictive drug found in cigarettes  Withdrawal symptoms can happen when you stop smoking and make it hard to quit  These include anxiety, depression, irritability, trouble sleeping, and increased appetite  You increase your chances of success if you PREPARE to quit  Set a quit date  [place] a date that is within the next 2 weeks  Do not pick a day that you think may be stressful or busy  Write down the day or [place] it on your calender  Tell friends and family that you plan to quit  Explain that you may have withdrawal symptoms when you try to quit  Ask them to support you  They may be able to encourage you and help reduce your stress to make it easier for you to quit  Make a list of your reasons for quitting  Put the list somewhere you will see it every day, such as your refrigerator  You can look at the list when you have a craving  Remove all tobacco and nicotine products from your home, car, and workplace  Also, remove anything else that will tempt you to smoke, such as lighters, matches, or ashtrays  Clean your car, home, and places at work that smell like smoke  The smell of smoke can trigger a craving  Identify triggers that make you want to smoke  This may include activities, feelings, or people  Also write down 1 way you can deal with each of your triggers   For example, if you want to smoke as soon as you wake up, plan another activity during this time, such as exercise  Make a plan for how you will quit  Learn about the tools that can help you quit, such as medicine, counseling, or nicotine replacement therapy  Choose at least 2 options to help you quit  Tools to help you stop smoking:     Counseling  from a trained healthcare provider can provide you with support and skills to quit smoking  The provider will also teach you to manage your withdrawal symptoms and cravings  You may receive counseling from one counselor, in group therapy, or through phone therapy called a quit line  Nicotine replacement therapy (NRT)  such as nicotine patches, gum, or lozenges may help reduce your nicotine cravings  You may get these without a doctor's order  Do not use e-cigarettes or smokeless tobacco in place of cigarettes or to help you quit  They still contain nicotine  Prescription medicines  such as nasal sprays or nicotine inhalers may help reduce your withdrawal symptoms  Other medicines may also be used to reduce your urge to smoke  Ask your healthcare provider about these medicines  You may need to start certain medicines 2 weeks before your quit date for them to work well  Hypnosis  is a practice that helps guide you through thoughts and feelings  Hypnosis may help decrease your cravings and make you more willing to quit  Acupuncture therapy  uses very thin needles to balance energy channels in the body  This is thought to help decrease cravings and symptoms of nicotine withdrawal      Support groups  let you talk to others who are trying to quit or have already quit  It may be helpful to speak with others about how they quit  Manage your cravings:     Avoid situations, people, and places that tempt you to smoke  Go to nonsmoking places, such as libraries or restaurants  Understand what tempts you and try to avoid these things  Keep your hands busy  Hold things such as a stress ball or pen       Put candy or toothpicks in your mouth  Keep lollipops, sugarless gum, or toothpicks with you at all times  Do not have alcohol or caffeine  These drinks may tempt you to smoke  Drink healthy liquids such as water or juice instead  Reward yourself when you resist your cravings  Rewards will motivate you and help you stay positive  Do an activity that distracts you from your craving  Examples include going for a walk, exercising, or cleaning  Prevent weight gain after you quit:  You may gain a few pounds after you quit smoking  It is healthier for you to gain a few pounds than to continue to smoke  The following can help you prevent weight gain:    Eat healthy foods  These include fruits, vegetables, whole-grain breads, low-fat dairy products, beans, lean meats, and fish  Eat healthy snacks, such as low-fat yogurt, if you get hungry between meals  Drink water before, during, and between meals  This will make your stomach feel full and help prevent you from overeating  Ask your healthcare provider how much liquid to drink each day and which liquids are best for you  Exercise  Take a walk or do some kind of exercise every day  Ask your healthcare provider what exercise is right for you  This may help reduce your cravings and reduce stress

## 2022-06-17 NOTE — LETTER
2022    To Ludivina Ferrara  : 1990      This letter is to advise you that your recent BLOODWORK for Sexually-Transmitted Diseases (HIV, hepatitis B, hepatitis C, and syphilis) results were reviewed by me and are NORMAL  Please contact our office for an appointment if you have any additional concerns      JAY Magaña
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
## 2022-06-17 NOTE — LETTER
2022    To Lindsey GONZALEZ: 1990      This letter is to advise you that your recent CULTURE for gonorrhea and chlamydia results were reviewed by me and are NORMAL  Please contact the office for an appointment if you have any additional concerns      JAY Leos

## 2022-06-17 NOTE — PROGRESS NOTES
[address] [facility] is a 33 y o  female who presents today for annual GYN exam   Her last pap smear was performed 6/10/2021 and result was NILM with negative HPV  She reports menses as absent due to Depoprovera with last dose given 2021  Her general medical history has been reviewed and she reports it as follows:    Past Medical History:   Diagnosis Date    Abnormal Pap smear of cervix      HSIL pap, colpo CIN3; LEEP 2020; 2021 NILM pap/neg HPV    Chlamydia     Depression     no meds    Fibromyalgia     no meds     Past Surgical History:   Procedure Laterality Date    CT COLPOSCOPY,CERVIX W/ADJ VAG,W/LOOP BX N/A 2020    Procedure: BIOPSY LEEP CERVIX;  Surgeon: Cherie Pearl MD;  Location:  MAIN OR;  Service: Gynecology    WISDOM TOOTH EXTRACTION       OB History        3    Para   3    Term   2       1    AB   0    Living   3       SAB   0    IAB   0    Ectopic   0    Multiple   0    Live Births   3               Social History     Tobacco Use    Smoking status: Current Every Day Smoker     Packs/day: 0 25     Types: Cigarettes    Smokeless tobacco: Current User   Vaping Use    Vaping Use: Never used   Substance Use Topics    Alcohol use: Yes     Comment: occ    Drug use: Yes     Types: Marijuana     Comment: intermittent use     Social History     Substance and Sexual Activity   Sexual Activity Yes    Partners: Male    Birth control/protection: None     Cancer-related family history includes Cancer in her maternal uncle  There is no history of Breast cancer, Colon cancer, or Ovarian cancer  Current Outpatient Medications   Medication Instructions    medroxyPROGESTERone (DEPO-PROVERA) 150 mg, Intramuscular, Every 3 months       Review of Systems:  Review of Systems   Constitutional: Negative  Gastrointestinal: Negative  Genitourinary: Positive for vaginal discharge   Negative for difficulty urinating, menstrual problem and pelvic pain  Skin: Negative  Physical Exam:  BP (!) 155/101   Pulse 85   Wt 66 4 kg (146 lb 6 4 oz)   BMI 25 93 kg/m²   Physical Exam  Constitutional:       General: She is not in acute distress  Appearance: She is well-developed  Genitourinary:      Vulva normal       No lesions in the vagina  Right Adnexa: not tender and no mass present  Left Adnexa: not tender and no mass present  No cervical motion tenderness or lesion  Uterus is not tender  Breasts:      Right: No mass, nipple discharge, skin change or tenderness  Left: No mass, nipple discharge, skin change or tenderness  Neck:      Thyroid: No thyromegaly  Cardiovascular:      Rate and Rhythm: Normal rate and regular rhythm  Pulmonary:      Effort: Pulmonary effort is normal    Abdominal:      Palpations: Abdomen is soft  Tenderness: There is no abdominal tenderness  Musculoskeletal:      Cervical back: Neck supple  Neurological:      Mental Status: She is alert and oriented to person, place, and time  Skin:     General: Skin is warm and dry  Vitals reviewed  Assessment/Plan:   1  Normal well-woman GYN exam   2  Cervical cancer screening:  Normal cervical exam   Pap smear not indicated at this time  3  STD screening:  Orders placed for vaginal GC/CT cultures  Orders placed for serum anti-HIV, anti-HCV, HbsAg, RPR    4  Breast cancer screening:  Normal breast exam   Reviewed breast self-awareness  5  Depression Screening: Patient's depression screening was assessed with a PHQ-2 score of 4  Their PHQ-9 score was 13  Patient assessed for underlying major depression  They have no active suicidal ideations  Brief counseling provided and recommend additional follow-up/re-evaluation next office visit  Referral placed for  consultation  6  BMI Counseling: Body mass index is 25 93 kg/m²  No intervention indicated     7  Tobacco Cessation Counseling: Tobacco cessation counseling and education was provided  The patient is sincerely urged to quit consumption of tobacco  She is not ready to quit tobacco  The numerous health risks of tobacco consumption were discussed  If she decides to quit, there are a number of helpful adjunctive aids, and she can see me to discuss nicotine replacement therapy, chantix, or bupropion anytime in the future  8  Contraception:  Desires to resume Depoprovera  Rx sent to pharmacy and advised patient to return with medication for injection next week (as long as ordered serum Bhcg is negative) and then every 3 months  9  Return to office in 1 year for annual GYN exam     Reviewed with patient that test results are available in [address] immediately, but that they will not necessarily be reviewed by me immediately  Explained that I will review results at my earliest opportunity and contact patient appropriately

## 2022-06-19 LAB
C TRACH DNA SPEC QL NAA+PROBE: NEGATIVE
HIV 1+2 AB+HIV1 P24 AG SERPL QL IA: NORMAL
N GONORRHOEA DNA SPEC QL NAA+PROBE: NEGATIVE

## 2022-06-20 LAB — RPR SER QL: NORMAL

## 2022-12-30 ENCOUNTER — APPOINTMENT (EMERGENCY)
Dept: CT IMAGING | Facility: HOSPITAL | Age: 32
End: 2022-12-30

## 2022-12-30 ENCOUNTER — HOSPITAL ENCOUNTER (EMERGENCY)
Facility: HOSPITAL | Age: 32
Discharge: HOME | End: 2022-12-30
Attending: EMERGENCY MEDICINE

## 2022-12-30 VITALS
BODY MASS INDEX: 23.6 KG/M2 | HEIGHT: 64 IN | WEIGHT: 138.23 LBS | TEMPERATURE: 98.3 F | SYSTOLIC BLOOD PRESSURE: 137 MMHG | OXYGEN SATURATION: 98 % | RESPIRATION RATE: 17 BRPM | HEART RATE: 89 BPM | DIASTOLIC BLOOD PRESSURE: 70 MMHG

## 2022-12-30 DIAGNOSIS — T50.901A ACCIDENTAL OVERDOSE, INITIAL ENCOUNTER: Primary | ICD-10-CM

## 2022-12-30 LAB
ALBUMIN SERPL BCP-MCNC: 4.2 G/DL (ref 3.5–5)
ALP SERPL-CCNC: 72 U/L (ref 46–116)
ALT SERPL W P-5'-P-CCNC: 25 U/L (ref 12–78)
AMPHETAMINES SERPL QL SCN: NEGATIVE
ANION GAP SERPL CALCULATED.3IONS-SCNC: 7 MMOL/L (ref 4–13)
APAP SERPL-MCNC: <2 UG/ML (ref 10–20)
AST SERPL W P-5'-P-CCNC: 28 U/L (ref 5–45)
BARBITURATES UR QL: NEGATIVE
BASOPHILS # BLD AUTO: 0.04 THOUSANDS/ÂΜL (ref 0–0.1)
BASOPHILS NFR BLD AUTO: 1 % (ref 0–1)
BENZODIAZ UR QL: NEGATIVE
BILIRUB SERPL-MCNC: 0.51 MG/DL (ref 0.2–1)
BUN SERPL-MCNC: 5 MG/DL (ref 5–25)
CALCIUM SERPL-MCNC: 8.7 MG/DL (ref 8.3–10.1)
CHLORIDE SERPL-SCNC: 101 MMOL/L (ref 96–108)
CO2 SERPL-SCNC: 31 MMOL/L (ref 21–32)
COCAINE UR QL: NEGATIVE
CREAT SERPL-MCNC: 0.59 MG/DL (ref 0.6–1.3)
EOSINOPHIL # BLD AUTO: 0 THOUSAND/ÂΜL (ref 0–0.61)
EOSINOPHIL NFR BLD AUTO: 0 % (ref 0–6)
ERYTHROCYTE [DISTWIDTH] IN BLOOD BY AUTOMATED COUNT: 12.6 % (ref 11.6–15.1)
ETHANOL SERPL-MCNC: <3 MG/DL (ref 0–3)
EXT PREGNANCY TEST URINE: NEGATIVE
EXT. CONTROL: NORMAL
GFR SERPL CREATININE-BSD FRML MDRD: 119 ML/MIN/1.73SQ M
GLUCOSE SERPL-MCNC: 121 MG/DL (ref 65–140)
HCT VFR BLD AUTO: 38.5 % (ref 34.8–46.1)
HGB BLD-MCNC: 12.5 G/DL (ref 11.5–15.4)
IMM GRANULOCYTES # BLD AUTO: 0.02 THOUSAND/UL (ref 0–0.2)
IMM GRANULOCYTES NFR BLD AUTO: 0 % (ref 0–2)
LYMPHOCYTES # BLD AUTO: 1.3 THOUSANDS/ÂΜL (ref 0.6–4.47)
LYMPHOCYTES NFR BLD AUTO: 15 % (ref 14–44)
MCH RBC QN AUTO: 31.8 PG (ref 26.8–34.3)
MCHC RBC AUTO-ENTMCNC: 32.5 G/DL (ref 31.4–37.4)
MCV RBC AUTO: 98 FL (ref 82–98)
METHADONE UR QL: NEGATIVE
MONOCYTES # BLD AUTO: 0.4 THOUSAND/ÂΜL (ref 0.17–1.22)
MONOCYTES NFR BLD AUTO: 5 % (ref 4–12)
NEUTROPHILS # BLD AUTO: 6.94 THOUSANDS/ÂΜL (ref 1.85–7.62)
NEUTS SEG NFR BLD AUTO: 79 % (ref 43–75)
NRBC BLD AUTO-RTO: 0 /100 WBCS
OPIATES UR QL SCN: NEGATIVE
OXYCODONE+OXYMORPHONE UR QL SCN: NEGATIVE
PCP UR QL: NEGATIVE
PLATELET # BLD AUTO: 319 THOUSANDS/UL (ref 149–390)
PMV BLD AUTO: 8.6 FL (ref 8.9–12.7)
POTASSIUM SERPL-SCNC: 3.9 MMOL/L (ref 3.5–5.3)
PROT SERPL-MCNC: 7.2 G/DL (ref 6.4–8.4)
RBC # BLD AUTO: 3.93 MILLION/UL (ref 3.81–5.12)
SALICYLATES SERPL-MCNC: 4.3 MG/DL (ref 3–20)
SODIUM SERPL-SCNC: 139 MMOL/L (ref 135–147)
THC UR QL: POSITIVE
WBC # BLD AUTO: 8.7 THOUSAND/UL (ref 4.31–10.16)

## 2022-12-30 RX ORDER — NALOXONE HYDROCHLORIDE 0.4 MG/ML
0.4 INJECTION, SOLUTION INTRAMUSCULAR; INTRAVENOUS; SUBCUTANEOUS ONCE
Status: COMPLETED | OUTPATIENT
Start: 2022-12-30 | End: 2022-12-30

## 2022-12-30 RX ORDER — ONDANSETRON 2 MG/ML
4 INJECTION INTRAMUSCULAR; INTRAVENOUS ONCE
Status: COMPLETED | OUTPATIENT
Start: 2022-12-30 | End: 2022-12-30

## 2022-12-30 RX ADMIN — NALOXONE HYDROCHLORIDE 4 MG: 4 SPRAY NASAL at 23:42

## 2022-12-30 RX ADMIN — NALOXONE HYDROCHLORIDE 0.4 MG: 0.4 INJECTION, SOLUTION INTRAMUSCULAR; INTRAVENOUS; SUBCUTANEOUS at 21:37

## 2022-12-30 RX ADMIN — ONDANSETRON 4 MG: 2 INJECTION INTRAMUSCULAR; INTRAVENOUS at 21:37

## 2022-12-30 RX ADMIN — NALOXONE HYDROCHLORIDE 0.4 MG: 0.4 INJECTION, SOLUTION INTRAMUSCULAR; INTRAVENOUS; SUBCUTANEOUS at 22:38

## 2022-12-31 NOTE — DISCHARGE INSTRUCTIONS
Please return to the ED for any concerns as outlined in the AVS or for any other concerns  Please follow-up with your primary care provider in 2 days for re-evaluation and further management  Use Narcan that was provided in the ED, as needed for recurrent overdose  Consider reaching out to substance abuse services that were provided to you at discharge

## 2022-12-31 NOTE — ED PROVIDER NOTES
History  Chief Complaint   Patient presents with   • Overdose - Accidental     Pt was at work and was experiencing some alterations in mental status  Coworkers called to have the pt seen  Pt states she smoked marijuana and took an oxy as well  On interaction with pt, pt is cooperative, delayed in response but otherwise stable  Patient is a 75-year-old female with no reported past medical history presents the ED from her work as she states her work sent her here because she was having alterations in her speech  Patient denies that her speech sounded different to her  She denies confusion, speech changes, vision changes, visual field loss, balance issues, gait disturbances, unilateral weakness, facial droop and focal deficits  Denies any recent falls or head injuries  States she did smoke some marijuana today and took 30 mg oxycodone tab a few hours PTA  Patient reports she does take oxycodone recreationally a few times a week  Denies that she was intentionally trying to overdose  She denies any alcohol or illicit drug use  She denies any medical complaints today  Specifically denies fever, chills, cough, congestion, chest pain, palpitations, shortness of breath, abdominal pain, nausea, vomiting, diarrhea, urinary complaints, neck pain, back pain, rash, headache, weakness and any other complaint  Denies SI, HI, AH and VH  Prior to Admission Medications   Prescriptions Last Dose Informant Patient Reported? Taking?    medroxyPROGESTERone (DEPO-PROVERA) 150 mg/mL injection Past Month  No Yes   Sig: Inject 1 mL (150 mg total) into a muscle every 3 (three) months      Facility-Administered Medications: None       Past Medical History:   Diagnosis Date   • Abnormal Pap smear of cervix     2019 HSIL pap, colpo CIN3; LEEP 6/2020; 6/2021 NILM pap/neg HPV   • Chlamydia 2016   • Depression     no meds   • Fibromyalgia     no meds       Past Surgical History:   Procedure Laterality Date   • AR COLPOSCOPY CERVIX VAG LOOP ELTRD BX CERVIX N/A 6/12/2020    Procedure: BIOPSY LEEP CERVIX;  Surgeon: Jagdish Nieto MD;  Location: [facility];  Service: Gynecology   • WISDOM TOOTH EXTRACTION         Family History   Problem Relation Age of Onset   • Asthma Mother    • Asthma Sister    • Cancer Maternal Uncle         lung   • Breast cancer Neg Hx    • Colon cancer Neg Hx    • Ovarian cancer Neg Hx      I have reviewed and agree with the history as documented  E-Cigarette/Vaping   • E-Cigarette Use Never User      E-Cigarette/Vaping Substances     Social History     Tobacco Use   • Smoking status: Every Day     Packs/day: 0 25     Types: Cigarettes   • Smokeless tobacco: Current   Vaping Use   • Vaping Use: Never used   Substance Use Topics   • Alcohol use: Yes     Comment: occ   • Drug use: Yes     Types: Marijuana     Comment: intermittent use       Review of Systems   Constitutional: Negative for chills and fever  HENT: Negative for ear pain and sore throat  Eyes: Negative for pain and visual disturbance  Respiratory: Negative for cough and shortness of breath  Cardiovascular: Negative for chest pain and palpitations  Gastrointestinal: Negative for abdominal pain and vomiting  Genitourinary: Negative for dysuria and hematuria  Musculoskeletal: Negative for arthralgias and back pain  Skin: Negative for color change and rash  Neurological: Negative for dizziness, seizures, syncope, facial asymmetry, weakness, light-headedness and headaches  Psychiatric/Behavioral: Negative for confusion  All other systems reviewed and are negative  Physical Exam  Physical Exam  Vitals and nursing note reviewed  Constitutional:       General: She is not in acute distress  Appearance: She is well-developed  She is not ill-appearing or toxic-appearing  Comments: Alert however patient does fall asleep without stimulation  Easily arousable  HENT:      Head: Normocephalic and atraumatic        Right Ear: Tympanic membrane and ear canal normal       Left Ear: Tympanic membrane and ear canal normal       Mouth/Throat:      Mouth: Mucous membranes are moist       Comments: Oropharynx patent and clear  Patient maintaining secretions without issue  Protecting airway  Eyes:      Conjunctiva/sclera: Conjunctivae normal    Cardiovascular:      Rate and Rhythm: Normal rate and regular rhythm  Heart sounds: No murmur heard  Pulmonary:      Effort: Pulmonary effort is normal  No respiratory distress  Breath sounds: Normal breath sounds  Comments: Clear breath sounds auscultated throughout all lung fields bilaterally  Adequate air movement  No wheeze, rhonchi, rales, increased WOB, retractions, accessory muscle use or respiratory distress  O2 sat- 92 % on RA  Abdominal:      Palpations: Abdomen is soft  Tenderness: There is no abdominal tenderness  Musculoskeletal:         General: No swelling  Cervical back: Neck supple  Lymphadenopathy:      Cervical: No cervical adenopathy  Skin:     General: Skin is warm and dry  Capillary Refill: Capillary refill takes less than 2 seconds  Neurological:      General: No focal deficit present  Mental Status: She is alert and oriented to person, place, and time  GCS: GCS eye subscore is 4  GCS verbal subscore is 5  GCS motor subscore is 6  Cranial Nerves: Cranial nerves 2-12 are intact  Sensory: Sensation is intact  Motor: Motor function is intact  No weakness, abnormal muscle tone or pronator drift  Coordination: Coordination is intact  Finger-Nose-Finger Test and Heel to [place] anahy Test normal       Gait: Gait is intact  Tandem walk normal    Psychiatric:         Mood and Affect: Mood normal       Comments: Slight delay in speech however no slurring of words or inappropriate speech            Vital Signs  ED Triage Vitals   Temperature Pulse Respirations Blood Pressure SpO2   12/30/22 2100 12/30/22 2039 12/30/22 2039 12/30/22 2039 12/30/22 2039   98 3 °F (36 8 °C) 98 13 166/92 92 %      Temp src Heart Rate Source Patient Position - Orthostatic VS BP Location FiO2 (%)   -- 12/30/22 2039 12/30/22 2039 12/30/22 2039 --    Monitor Lying Right arm       Pain Score       12/30/22 2039       No Pain           Vitals:    12/30/22 2039 12/30/22 2200 12/30/22 2343   BP: 166/92 (!) 153/105 137/70   Pulse: 98 102 89   Patient Position - Orthostatic VS: Lying           Visual Acuity      ED Medications  Medications   naloxone (NARCAN) injection 0 4 mg (0 4 mg Intravenous Given 12/30/22 2137)   ondansetron (ZOFRAN) injection 4 mg (4 mg Intravenous Given 12/30/22 2137)   naloxone [facility]) injection 0 4 mg (0 4 mg Intravenous Given 12/30/22 2238)   naloxone nasal- Given to patient by provider at discharge  (NARCAN) 4 mg/0 1 mL nasal spray 4 mg (4 mg Does not apply Given by Other 12/30/22 2342)       Diagnostic Studies  Results Reviewed     Procedure Component Value Units Date/Time    Acetaminophen level-If concentration is detectable, please discuss with medical  on call  [[id number]]  (Abnormal) Collected: 12/30/22 2125    Lab Status: Final result Specimen: Blood from Arm, Right Updated: 12/30/22 2216     Acetaminophen Level <2 ug/mL     Rapid drug screen, urine [[id number]]  (Abnormal) Collected: 12/30/22 2150    Lab Status: Final result Specimen: Urine, Clean Catch Updated: 12/30/22 2211     Amph/Meth UR Negative     Barbiturate Ur Negative     Benzodiazepine Urine Negative     Cocaine Urine Negative     Methadone Urine Negative     Opiate Urine Negative     PCP Ur Negative     THC Urine Positive     Oxycodone Urine Negative    Narrative:      Presumptive report  If requested, specimen will be sent to reference lab for confirmation  FOR MEDICAL PURPOSES ONLY  IF CONFIRMATION NEEDED PLEASE CONTACT THE LAB WITHIN 5 DAYS      Drug Screen Cutoff Levels:  AMPHETAMINE/METHAMPHETAMINES  1000 ng/mL  BARBITURATES     200 ng/mL  BENZODIAZEPINES     200 ng/mL  COCAINE      300 ng/mL  METHADONE      300 ng/mL  OPIATES      300 ng/mL  PHENCYCLIDINE     25 ng/mL  THC       50 ng/mL  OXYCODONE      054 ng/mL    Salicylate level [[id number]]  (Normal) Collected: 12/30/22 2125    Lab Status: Final result Specimen: Blood from Arm, Right Updated: 02/66/43 7820     Salicylate Lvl 4 3 mg/dL     POCT pregnancy, urine [[id number]]  (Normal) Resulted: 12/30/22 2155    Lab Status: Final result Updated: 12/30/22 2156     EXT Preg Test, Ur Negative     Control Valid    Ethanol [[id number]]  (Normal) Collected: 12/30/22 2125    Lab Status: Final result Specimen: Blood from Arm, Right Updated: 12/30/22 2155     Ethanol Lvl <3 mg/dL     Comprehensive metabolic panel [[id number]]  (Abnormal) Collected: 12/30/22 2125    Lab Status: Final result Specimen: Blood from Arm, Right Updated: 12/30/22 2150     Sodium 139 mmol/L      Potassium 3 9 mmol/L      Chloride 101 mmol/L      CO2 31 mmol/L      ANION GAP 7 mmol/L      BUN 5 mg/dL      Creatinine 0 59 mg/dL      Glucose 121 mg/dL      Calcium 8 7 mg/dL      AST 28 U/L      ALT 25 U/L      Alkaline Phosphatase 72 U/L      Total Protein 7 2 g/dL      Albumin 4 2 g/dL      Total Bilirubin 0 51 mg/dL      eGFR 119 ml/min/1 73sq m     Narrative:      [facility] guidelines for Chronic Kidney Disease (CKD):   •  Stage 1 with normal or high GFR (GFR > 90 mL/min/1 73 square meters)  •  Stage 2 Mild CKD (GFR = 60-89 mL/min/1 73 square meters)  •  Stage 3A Moderate CKD (GFR = 45-59 mL/min/1 73 square meters)  •  Stage 3B Moderate CKD (GFR = 30-44 mL/min/1 73 square meters)  •  Stage 4 Severe CKD (GFR = 15-29 mL/min/1 73 square meters)  •  Stage 5 End Stage CKD (GFR <15 mL/min/1 73 square meters)  Note: GFR calculation is accurate only with a steady state creatinine    CBC and differential [[id number]]  (Abnormal) Collected: 12/30/22 2125    Lab Status: Final result Specimen: Blood from Arm, Right Updated: 12/30/22 2135     WBC 8 70 Thousand/uL      RBC 3 93 Million/uL      Hemoglobin 12 5 g/dL      Hematocrit 38 5 %      MCV 98 fL      MCH 31 8 pg      MCHC 32 5 g/dL      RDW 12 6 %      MPV 8 6 fL      Platelets 813 Thousands/uL      nRBC 0 /100 WBCs      Neutrophils Relative 79 %      Immat GRANS % 0 %      Lymphocytes Relative 15 %      Monocytes Relative 5 %      Eosinophils Relative 0 %      Basophils Relative 1 %      Neutrophils Absolute 6 94 Thousands/µL      Immature Grans Absolute 0 02 Thousand/uL      Lymphocytes Absolute 1 30 Thousands/µL      Monocytes Absolute 0 40 Thousand/µL      Eosinophils Absolute 0 00 Thousand/µL      Basophils Absolute 0 04 Thousands/µL                  CT head without contrast   Final Result by Kareen Jamison MD (12/30 2215)      No acute intracranial abnormality  Workstation performed: [id number]                    Procedures  ECG 12 Lead Documentation Only    Date/Time: 12/31/2022 5:49 AM  Performed by: Denisa Hidalgo PA-C  Authorized by: Denisa Hidalgo PA-C     ECG reviewed by me, the ED Provider: yes    Patient location:  ED  Interpretation:     Interpretation: normal    Rate:     ECG rate:  90    ECG rate assessment: normal    Rhythm:     Rhythm: sinus rhythm    Ectopy:     Ectopy: none    QRS:     QRS axis:  Normal    QRS intervals:  Normal  Conduction:     Conduction: normal    ST segments:     ST segments:  Normal  T waves:     T waves: normal               ED Course  ED Course as of 12/31/22 0555   Fri Dec 30, 2022   2120 In light of pt reportedly taking 30 mg oxycodone, now with slowed responsiveness and easily falling asleep will give a dose of narcan  2200 Pt more alert and awake after narcan  Pt symptoms likely 2/2 to oxycodone use     2226 THC URINE(!): Positive  In line with patient reportedly smoking marijuana  2226 PREGNANCY TEST URINE: Negative   2226 Comprehensive metabolic panel(!)  No electrolyte abnormalities  2226 WBC: 8 70   2226 Hemoglobin: 12 5   2227 CT head IMPRESSION:     No acute intracranial abnormality       2235 Labs grossly unremarkable  On bedside reevaluation patient continues to rest in the stretcher in no acute distress  She is awake, alert and interactive  No complaints at this time  However without stimulation patient does fall back asleep, likely still effects from opiates  We will give another dose of 0 4 Narcan    2300 Pt more awake and responsive after narcan  Will continue to monitor  2326 On bedside reevaluation patient resting in the stretcher in no acute distress  Awake, alert and interactive  No complaints at this time  O2 saturations have been normal throughout her time in the ED  O2 sat- 96% with adequate wave form at bedside  Patient offered inpatient care for substance use/abuse however she declines  Provided pt with substance abuse outpatient resources from crisis  2334 Narcan provided to patient at discharge  Advised patient to follow-up with her PCP in 2 days for reevaluation and further management  Strict return precautions verbally communicated to the patient as outlined in the AVS   All patient questions and concerns were answered  Patient verbally communicated their understanding and agreement to the above plan  Pt stable at d/c  MDM  Number of Diagnoses or Management Options  Risk of Complications, Morbidity, and/or Mortality  General comments: DDx including but not limited to: accidental OD, substance abuse, metabolic abnormality, intracranial etiology, cardiac etiology  Will obtain CBC, CMP, EKG, Urine pregnancy, UDS, coma panel and CT head to r/o other possible cause of pt's presenting complaints  NIH-0  Likely related to pt's use of oxycodone  Will give a dose of nacan in light of pt reportedly taking oxycodone  Will re-evaluate after narcan, labs and imaging           Disposition  Final diagnoses:   Accidental overdose, initial encounter     Time reflects when diagnosis was documented in both MDM as applicable and the Disposition within this note     Time User Action Codes Description Comment    12/30/2022 11:35 PM Navneet Muniz Add [[id number] 901A] Accidental overdose, initial encounter       ED Disposition     ED Disposition   Discharge    Condition   Stable    Date/Time   Fri Dec 30, 2022 11:33 PM    Lis Arteaga Gris 950 discharge to home/self care  Follow-up Information     Follow up With Specialties Details Why Contact Info Additional [address] [facility] Emergency Medicine Go to  As needed, If symptoms worsen [facility] [id number]  [address] [facility] Emergency Department, [address] [place] [address], [id number]          Discharge Medication List as of 12/30/2022 11:41 PM      CONTINUE these medications which have NOT CHANGED    Details   medroxyPROGESTERone (DEPO-PROVERA) 150 mg/mL injection Inject 1 mL (150 mg total) into a muscle every 3 (three) months, Starting Fri 6/17/2022, Normal             No discharge procedures on file      PDMP Review     None          ED Provider  Electronically Signed by Lucía Delong PA-C  01/03/23 0727

## 2023-01-01 LAB
ATRIAL RATE: 90 BPM
P AXIS: 81 DEGREES
PR INTERVAL: 142 MS
QRS AXIS: 53 DEGREES
QRSD INTERVAL: 78 MS
QT INTERVAL: 338 MS
QTC INTERVAL: 413 MS
T WAVE AXIS: 49 DEGREES
VENTRICULAR RATE: 90 BPM

## 2023-05-11 ENCOUNTER — OFFICE VISIT (OUTPATIENT)
Dept: FAMILY MEDICINE CLINIC | Facility: CLINIC | Age: 33
End: 2023-05-11

## 2023-05-11 VITALS
TEMPERATURE: 98.8 F | DIASTOLIC BLOOD PRESSURE: 82 MMHG | HEIGHT: 64 IN | BODY MASS INDEX: 26.12 KG/M2 | WEIGHT: 153 LBS | RESPIRATION RATE: 17 BRPM | OXYGEN SATURATION: 97 % | SYSTOLIC BLOOD PRESSURE: 126 MMHG | HEART RATE: 98 BPM

## 2023-05-11 DIAGNOSIS — F11.90 OPIOID USE DISORDER: Primary | ICD-10-CM

## 2023-05-11 NOTE — ASSESSMENT & PLAN NOTE
Using oxycodone 30 mg q3-4 days for the past 7 years after undergoing treatment for back pain after delivery of her son   She obtains from the street; uses medical marijuana 8 blunts/day; denies using IV-drugs or other illicit substances  She is interested in discontinuing this, so has been weaning herself off by using half the tablet the past 2 weeks, but still experiences withdrawals after 3 days  Last use was yesterday  Interested in starting suboxone    POCT UDS:  COWS score is only 1; counseled against starting today due to risk of precipitating a stronger withdrawal  Since it is now Thursday, patient runs the possibility of withdrawal over the weekend when our clinic is not open  Since she obtains her supply from the streets, she may opt to do so during withdrawal  Will therefore send her a script tomorrow    Suboxone contract is signed; placed in scan bin  Extensive counseling to wait until experiencing more withdrawal over the weekend before starting the suboxone  Will send along with naloxone  Labs ordered including toxicology, cbc, Cmp, Hep C, HIV and rpr  Follow up in 1 week for maintenance MAT

## 2023-05-11 NOTE — PATIENT INSTRUCTIONS
Opioid Use Disorder   WHAT YOU NEED TO KNOW:   Opioid use disorder (OUD) is a medical condition that develops from long-term use or misuse of an opioid  You are not able to stop taking the opioid even though it causes physical or social problems  OUD may be use of an opioid such as heroin or misuse of a prescription opioid such as fentanyl  This disorder is also called opioid abuse  DISCHARGE INSTRUCTIONS:   Call your local emergency number (911 in the [address] [facility]) or have someone call if:   You have chest pain or trouble breathing  You have a seizure  You cannot be woken  Return to the emergency department if:   You have trouble staying awake and your breathing is slow or shallow  You have a fast, slow, or irregular heartbeat  You have pale or cold skin  You feel lightheaded or faint  Your speech is slurred, or you are confused  Call your doctor if:   You have nausea and are vomiting, or you cannot stop vomiting  You have balance problems  You have questions or concerns about your condition or care  Therapy  may be offered in a hospital, outpatient facility, or treatment center  Your healthcare provider can help you make decisions about treatment  Therapy may include work with a psychiatrist, psychologist, or therapist  Therapy can happen in group or individual sessions  Some therapy may include family members  Your healthcare provider or therapist may be able to help you find a support group in your area  A support group is a way to get help from others who have OUD  What you need to know about opioid safety:   Do not suddenly stop the opioid  A sudden stop may cause dangerous side effects  Work with your healthcare provider to decrease the amount slowly  Do not take prescription opioids that belong to someone else  The kind or amount may not be right for you  Do not mix opioids with other medicines, drugs, or alcohol    The combination can cause an overdose, or cause you to stop breathing  Alcohol, sleeping pills, and medicines such as antihistamines can make you sleepy  A combination with opioids can lead to a coma  Learn about the signs of an overdose  Examples include slow breathing, pale or cold skin, and small pupils  Tell others about these signs so they will get help for you if needed  Talk to your healthcare provider about naloxone  You may be able to keep naloxone at home in case of an overdose  Your family and friends can also be trained on how to give it to you if needed  Take prescribed opioids exactly as directed  Do not take more than the recommended amount  Do not take it more often than recommended or for a different reason  Be sure to remove an old patch before you place a new one  Make sure the patch is not exposed to sunlight  Sunlight speeds up the opioid release from the patch  Keep opioids out of the reach of children  Store opioids in a locked cabinet or in a location that children cannot get to  Follow instructions for what to do with leftover prescription opioids  Your healthcare provider will give you instructions for how to dispose of it safely  This helps make sure no one else gets to it  Follow up with your doctor or therapist as directed:  Write down your questions so you remember to ask them during your visits  For support and more information:   Substance Abuse and [address] , [address] [place]  Web Address: https://WeHaus/    [facility]  [address] [place] [id number]  Phone: 2- 833 - [phone]  Web Address: www bernabe [employer]ih gov  © Copyright [employer] 2022 Information is for End User's use only and may not be sold, redistributed or otherwise used for commercial purposes  The above information is an  only  It is not intended as medical advice for individual conditions or treatments   Talk to your doctor, nurse or pharmacist before following any medical regimen to see if it is safe and effective for you

## 2023-05-12 RX ORDER — BUPRENORPHINE AND NALOXONE 8; 2 MG/1; MG/1
8 FILM, SOLUBLE BUCCAL; SUBLINGUAL 2 TIMES DAILY
Qty: 14 FILM | Refills: 0 | Status: SHIPPED | OUTPATIENT
Start: 2023-05-12 | End: 2023-05-19

## 2023-05-12 RX ORDER — NALOXONE HYDROCHLORIDE 4 MG/.1ML
SPRAY NASAL
Qty: 1 EACH | Refills: 1 | Status: SHIPPED | OUTPATIENT
Start: 2023-05-12 | End: 2024-05-11

## 2023-05-16 LAB
AMPHETAMINES UR QL SCN: NEGATIVE NG/ML
BARBITURATES UR QL SCN: NEGATIVE NG/ML
BENZODIAZ UR QL: NEGATIVE NG/ML
BZE UR QL: NEGATIVE NG/ML
CANNABINOIDS UR QL SCN: POSITIVE
METHADONE UR QL SCN: NEGATIVE NG/ML
OPIATES UR QL: NEGATIVE NG/ML
PCP UR QL: NEGATIVE NG/ML
PROPOXYPH UR QL SCN: NEGATIVE NG/ML

## 2023-06-19 ENCOUNTER — TELEPHONE (OUTPATIENT)
Dept: OBGYN CLINIC | Facility: CLINIC | Age: 33
End: 2023-06-19

## 2023-06-21 NOTE — ASSESSMENT & PLAN NOTE
Cervical ROM WNL, no medical conditions that would limit patient from driving, she denies any history of unexplained syncope, seizures or cardiac events  Form filled out in front of patient and given back  20/25 vision bilaterally uncorrected  Form copied and placed to be scanned and filed to her chart  Received request via: Pharmacy    Was the patient seen in the last year in this department? Yes    Does the patient have an active prescription (recently filled or refills available) for medication(s) requested? No    Does the patient have long-term Plus and need 100 day supply (blood pressure, diabetes and cholesterol meds only)? Medication is not for cholesterol, blood pressure or diabetes

## 2023-07-31 ENCOUNTER — TELEPHONE (OUTPATIENT)
Dept: OBGYN CLINIC | Facility: CLINIC | Age: 33
End: 2023-07-31

## 2023-10-09 ENCOUNTER — TELEPHONE (OUTPATIENT)
Dept: FAMILY MEDICINE CLINIC | Facility: CLINIC | Age: 33
End: 2023-10-09

## 2023-10-09 NOTE — TELEPHONE ENCOUNTER
Milo galeas, my name is Michael Brunson and I was calling in to make an appointment for a TB shot. Can you please give me a call back at [phone]? Thank you. Message left on machine for patient to return call to schedule appointment.

## 2024-04-06 ENCOUNTER — HOSPITAL ENCOUNTER (EMERGENCY)
Facility: HOSPITAL | Age: 34
Discharge: HOME | End: 2024-04-06
Attending: EMERGENCY MEDICINE
Payer: MEDICARE

## 2024-04-06 VITALS
TEMPERATURE: 98.4 F | OXYGEN SATURATION: 100 % | SYSTOLIC BLOOD PRESSURE: 164 MMHG | HEART RATE: 67 BPM | RESPIRATION RATE: 16 BRPM | DIASTOLIC BLOOD PRESSURE: 111 MMHG

## 2024-04-06 DIAGNOSIS — K02.9 DENTAL CARIES: ICD-10-CM

## 2024-04-06 DIAGNOSIS — R03.0 ELEVATED BLOOD PRESSURE READING: ICD-10-CM

## 2024-04-06 DIAGNOSIS — K08.89 DENTALGIA: Primary | ICD-10-CM

## 2024-04-06 PROCEDURE — 99282 EMERGENCY DEPT VISIT SF MDM: CPT

## 2024-04-06 PROCEDURE — 99284 EMERGENCY DEPT VISIT MOD MDM: CPT | Performed by: EMERGENCY MEDICINE

## 2024-04-06 RX ORDER — NAPROXEN 250 MG/1
250 TABLET ORAL
Qty: 21 TABLET | Refills: 0 | Status: SHIPPED | OUTPATIENT
Start: 2024-04-06 | End: 2024-04-13

## 2024-04-06 RX ORDER — PENICILLIN V POTASSIUM 500 MG/1
500 TABLET ORAL 4 TIMES DAILY
Qty: 40 TABLET | Refills: 0 | Status: SHIPPED | OUTPATIENT
Start: 2024-04-06 | End: 2024-04-13

## 2024-04-06 RX ORDER — NAPROXEN 250 MG/1
500 TABLET ORAL ONCE
Status: COMPLETED | OUTPATIENT
Start: 2024-04-06 | End: 2024-04-06

## 2024-04-06 RX ADMIN — NAPROXEN SODIUM 500 MG: 250 TABLET ORAL at 08:57

## 2024-04-06 NOTE — Clinical Note
Ladonna Go Denis was seen and treated in our emergency department on 4/6/2024.                Diagnosis:     Araceli Brar  .    She may return on this date:          If you have any questions or concerns, please don't hesitate to call.      Zoë Jimenez RN    ______________________________           _______________          _______________  Hospital Representative                              Date                                Time

## 2024-04-06 NOTE — ED PROVIDER NOTES
History  Chief Complaint   Patient presents with    Dental Pain     Patient with right top and bottom dental pain that started yesterday         Female presents for evaluation of 2 weeks of sharp right-sided upper and lower dental pain.  The pain is constant, getting progressively worse, worse with eating or drinking.  No trismus, neck pain or neck stiffness, headache, focal numbness or weakness, nausea, vomit, fevers, chills, sensation of tongue swelling.      History provided by:  Patient  Dental Pain  Associated symptoms: no congestion, no fever, no headaches and no neck pain        Prior to Admission Medications   Prescriptions Last Dose Informant Patient Reported? Taking?   medroxyPROGESTERone (DEPO-PROVERA) 150 mg/mL injection   No No   Sig: Inject 1 mL (150 mg total) into a muscle every 3 (three) months   naloxone (NARCAN) 4 mg/0.1 mL nasal spray   No No   Sig: Administer 1 spray into a nostril. If no response after 2-3 minutes, give another dose in the other nostril using a new spray.      Facility-Administered Medications: None       Past Medical History:   Diagnosis Date    Abnormal Pap smear of cervix     2019 HSIL pap, colpo CIN3; LEEP 6/2020; 6/2021 NILM pap/neg HPV    Chlamydia 2016    Depression     no meds    Fibromyalgia     no meds       Past Surgical History:   Procedure Laterality Date    NM COLPOSCOPY CERVIX VAG LOOP ELTRD BX CERVIX N/A 6/12/2020    Procedure: BIOPSY LEEP CERVIX;  Surgeon: Jeremias Allison MD;  Location:  MAIN OR;  Service: Gynecology    WISDOM TOOTH EXTRACTION         Family History   Problem Relation Age of Onset    Asthma Mother     Asthma Sister     Cancer Maternal Uncle         lung    Breast cancer Neg Hx     Colon cancer Neg Hx     Ovarian cancer Neg Hx      I have reviewed and agree with the history as documented.    E-Cigarette/Vaping    E-Cigarette Use Never User      E-Cigarette/Vaping Substances     Social History     Tobacco Use    Smoking status: Every Day      Current packs/day: 0.25     Types: Cigarettes    Smokeless tobacco: Current   Vaping Use    Vaping status: Never Used   Substance Use Topics    Alcohol use: Yes     Comment: occ    Drug use: Yes     Types: Marijuana     Comment: intermittent use       Review of Systems   Constitutional:  Negative for activity change, appetite change, fatigue and fever.   HENT:  Positive for dental problem. Negative for congestion, ear pain, rhinorrhea and sore throat.    Eyes:  Negative for pain and redness.   Respiratory:  Negative for chest tightness, shortness of breath and wheezing.    Cardiovascular:  Negative for chest pain and palpitations.   Gastrointestinal:  Negative for abdominal pain, blood in stool, constipation, diarrhea, nausea and vomiting.   Endocrine: Negative for cold intolerance and heat intolerance.   Genitourinary:  Negative for dysuria, frequency and hematuria.   Musculoskeletal:  Negative for arthralgias, myalgias, neck pain and neck stiffness.   Skin:  Negative for color change, pallor and rash.   Neurological:  Negative for dizziness, weakness, light-headedness, numbness and headaches.   Hematological:  Does not bruise/bleed easily.   Psychiatric/Behavioral:  Negative for agitation, hallucinations and suicidal ideas.        Physical Exam  Physical Exam  Constitutional:       Appearance: She is well-developed.   HENT:      Head: Normocephalic.      Comments: No facial swelling or skin changes noted.  No evidence of Flex's angina.  No tenderness to palpation of the facial bones, patient has multiple dental caries with normal-appearing gums.  Eyes:      Pupils: Pupils are equal, round, and reactive to light.   Neck:      Vascular: No JVD.      Trachea: No tracheal deviation.   Cardiovascular:      Rate and Rhythm: Normal rate and regular rhythm.   Pulmonary:      Effort: No tachypnea, accessory muscle usage or respiratory distress.   Abdominal:      General: There is no distension.   Musculoskeletal:       Cervical back: Normal range of motion and neck supple. No rigidity or tenderness.      Right lower leg: Normal.      Left lower leg: Normal.   Lymphadenopathy:      Cervical: No cervical adenopathy.   Skin:     General: Skin is warm.      Capillary Refill: Capillary refill takes less than 2 seconds.   Neurological:      Mental Status: She is alert and oriented to person, place, and time.   Psychiatric:         Behavior: Behavior normal.         Vital Signs  ED Triage Vitals [04/06/24 0827]   Temperature Pulse Respirations Blood Pressure SpO2   98.4 °F (36.9 °C) 67 16 (!) 164/111 100 %      Temp Source Heart Rate Source Patient Position - Orthostatic VS BP Location FiO2 (%)   Oral Monitor Sitting Right arm --      Pain Score       10 - Worst Possible Pain           Vitals:    04/06/24 0827   BP: (!) 164/111   Pulse: 67   Patient Position - Orthostatic VS: Sitting         Visual Acuity      ED Medications  Medications   naproxen (NAPROSYN) tablet 500 mg (500 mg Oral Given 4/6/24 0857)       Diagnostic Studies  Results Reviewed       None                   No orders to display              Procedures  Procedures         ED Course                                             Medical Decision Making  Dentalgia secondary to dental caries without evidence of Flex's angina, abscess, facial cellulitis.  Will treat with antibiotics, dental follow-up.    Elevated blood pressure without symptoms.  No medical workup is indicated.  Patient will follow-up with her PCP.    Risk  Prescription drug management.             Disposition  Final diagnoses:   Dentalgia   Dental caries   Elevated blood pressure reading     Time reflects when diagnosis was documented in both MDM as applicable and the Disposition within this note       Time User Action Codes Description Comment    4/6/2024  8:52 AM Jese Menjivar [K08.89] Dentalgia     4/6/2024  8:52 AM Jese Menjivar Add [K02.9] Dental caries     4/6/2024  8:52 AM Jese Menjivar Add  [R03.0] Elevated blood pressure reading           ED Disposition       ED Disposition   Discharge    Condition   Stable    Date/Time   Sat Apr 6, 2024  8:52 AM    Comment   Araceli Denis discharge to home/self care.                   Follow-up Information       Follow up With Specialties Details Why Contact Info    [facility]  Schedule an appointment as soon as possible for a visit in 2 days  [address] [facility] [address]  [phone]            Patient's Medications   Discharge Prescriptions    NAPROXEN (NAPROSYN) 250 MG TABLET    Take 1 tablet (250 mg total) by mouth 3 (three) times a day with meals for 7 days       Start Date: 4/6/2024  End Date: 4/13/2024       Order Dose: 250 mg       Quantity: 21 tablet    Refills: 0    PENICILLIN V POTASSIUM (VEETID) 500 MG TABLET    Take 1 tablet (500 mg total) by mouth 4 (four) times a day for 7 days       Start Date: 4/6/2024  End Date: 4/13/2024       Order Dose: 500 mg       Quantity: 40 tablet    Refills: 0       No discharge procedures on file.    PDMP Review         Value Time User    PDMP Reviewed  Yes 5/12/2023 10:23 AM Elton Lechuga MD            ED Provider  Electronically Signed by             Jese Menjivar MD  04/06/24 0859

## 2024-08-11 ENCOUNTER — HOSPITAL ENCOUNTER (EMERGENCY)
Facility: HOSPITAL | Age: 34
Discharge: HOME | End: 2024-08-12
Attending: EMERGENCY MEDICINE
Payer: MEDICARE

## 2024-08-11 VITALS
DIASTOLIC BLOOD PRESSURE: 71 MMHG | HEART RATE: 95 BPM | WEIGHT: 126.76 LBS | TEMPERATURE: 98.6 F | SYSTOLIC BLOOD PRESSURE: 115 MMHG | BODY MASS INDEX: 21.76 KG/M2 | RESPIRATION RATE: 18 BRPM | OXYGEN SATURATION: 97 %

## 2024-08-11 DIAGNOSIS — E87.8 HYPERCHLOREMIA: ICD-10-CM

## 2024-08-11 DIAGNOSIS — R41.82 ALTERED MENTAL STATUS: ICD-10-CM

## 2024-08-11 DIAGNOSIS — R25.9 INVOLUNTARY MOVEMENTS: ICD-10-CM

## 2024-08-11 DIAGNOSIS — F19.10 DRUG ABUSE (HCC): Primary | ICD-10-CM

## 2024-08-11 DIAGNOSIS — N17.9 AKI (ACUTE KIDNEY INJURY) (HCC): ICD-10-CM

## 2024-08-11 LAB
ALBUMIN SERPL BCG-MCNC: 4.5 G/DL (ref 3.5–5)
ALP SERPL-CCNC: 48 U/L (ref 34–104)
ALT SERPL W P-5'-P-CCNC: 23 U/L (ref 7–52)
AMPHETAMINES SERPL QL SCN: POSITIVE
ANION GAP SERPL CALCULATED.3IONS-SCNC: 13 MMOL/L (ref 4–13)
APAP SERPL-MCNC: <2 UG/ML (ref 10–20)
AST SERPL W P-5'-P-CCNC: 33 U/L (ref 13–39)
ATRIAL RATE: 126 BPM
ATRIAL RATE: 81 BPM
BARBITURATES UR QL: NEGATIVE
BASOPHILS # BLD AUTO: 0.08 THOUSANDS/ÂΜL (ref 0–0.1)
BASOPHILS NFR BLD AUTO: 0 % (ref 0–1)
BENZODIAZ UR QL: POSITIVE
BILIRUB SERPL-MCNC: 0.76 MG/DL (ref 0.2–1)
BUN SERPL-MCNC: 15 MG/DL (ref 5–25)
CALCIUM SERPL-MCNC: 9.6 MG/DL (ref 8.4–10.2)
CHLORIDE SERPL-SCNC: 109 MMOL/L (ref 96–108)
CO2 SERPL-SCNC: 24 MMOL/L (ref 21–32)
COCAINE UR QL: NEGATIVE
CREAT SERPL-MCNC: 1.14 MG/DL (ref 0.6–1.3)
EOSINOPHIL # BLD AUTO: 0.01 THOUSAND/ÂΜL (ref 0–0.61)
EOSINOPHIL NFR BLD AUTO: 0 % (ref 0–6)
ERYTHROCYTE [DISTWIDTH] IN BLOOD BY AUTOMATED COUNT: 12.7 % (ref 11.6–15.1)
ETHANOL SERPL-MCNC: <10 MG/DL
FENTANYL UR QL SCN: POSITIVE
GFR SERPL CREATININE-BSD FRML MDRD: 62 ML/MIN/1.73SQ M
GLUCOSE SERPL-MCNC: 106 MG/DL (ref 65–140)
HCT VFR BLD AUTO: 36.4 % (ref 34.8–46.1)
HGB BLD-MCNC: 11.8 G/DL (ref 11.5–15.4)
HYDROCODONE UR QL SCN: NEGATIVE
IMM GRANULOCYTES # BLD AUTO: 0.14 THOUSAND/UL (ref 0–0.2)
IMM GRANULOCYTES NFR BLD AUTO: 1 % (ref 0–2)
LYMPHOCYTES # BLD AUTO: 2.54 THOUSANDS/ÂΜL (ref 0.6–4.47)
LYMPHOCYTES NFR BLD AUTO: 11 % (ref 14–44)
MCH RBC QN AUTO: 32.5 PG (ref 26.8–34.3)
MCHC RBC AUTO-ENTMCNC: 32.4 G/DL (ref 31.4–37.4)
MCV RBC AUTO: 100 FL (ref 82–98)
METHADONE UR QL: NEGATIVE
MONOCYTES # BLD AUTO: 1.92 THOUSAND/ÂΜL (ref 0.17–1.22)
MONOCYTES NFR BLD AUTO: 8 % (ref 4–12)
NEUTROPHILS # BLD AUTO: 19.06 THOUSANDS/ÂΜL (ref 1.85–7.62)
NEUTS SEG NFR BLD AUTO: 80 % (ref 43–75)
NRBC BLD AUTO-RTO: 0 /100 WBCS
OPIATES UR QL SCN: NEGATIVE
OXYCODONE+OXYMORPHONE UR QL SCN: NEGATIVE
P AXIS: 43 DEGREES
P AXIS: 62 DEGREES
PCP UR QL: NEGATIVE
PLATELET # BLD AUTO: 364 THOUSANDS/UL (ref 149–390)
PMV BLD AUTO: 8.8 FL (ref 8.9–12.7)
POTASSIUM SERPL-SCNC: 3.9 MMOL/L (ref 3.5–5.3)
PR INTERVAL: 144 MS
PR INTERVAL: 144 MS
PROT SERPL-MCNC: 7 G/DL (ref 6.4–8.4)
QRS AXIS: 14 DEGREES
QRS AXIS: 15 DEGREES
QRSD INTERVAL: 76 MS
QRSD INTERVAL: 84 MS
QT INTERVAL: 312 MS
QT INTERVAL: 400 MS
QTC INTERVAL: 451 MS
QTC INTERVAL: 464 MS
RBC # BLD AUTO: 3.63 MILLION/UL (ref 3.81–5.12)
SALICYLATES SERPL-MCNC: <5 MG/DL (ref 3–20)
SODIUM SERPL-SCNC: 146 MMOL/L (ref 135–147)
T WAVE AXIS: 2 DEGREES
T WAVE AXIS: 8 DEGREES
THC UR QL: POSITIVE
VENTRICULAR RATE: 126 BPM
VENTRICULAR RATE: 81 BPM
WBC # BLD AUTO: 23.75 THOUSAND/UL (ref 4.31–10.16)

## 2024-08-11 PROCEDURE — 80053 COMPREHEN METABOLIC PANEL: CPT

## 2024-08-11 PROCEDURE — 80143 DRUG ASSAY ACETAMINOPHEN: CPT

## 2024-08-11 PROCEDURE — 99285 EMERGENCY DEPT VISIT HI MDM: CPT

## 2024-08-11 PROCEDURE — 93005 ELECTROCARDIOGRAM TRACING: CPT

## 2024-08-11 PROCEDURE — 96376 TX/PRO/DX INJ SAME DRUG ADON: CPT

## 2024-08-11 PROCEDURE — 96374 THER/PROPH/DIAG INJ IV PUSH: CPT

## 2024-08-11 PROCEDURE — 93010 ELECTROCARDIOGRAM REPORT: CPT | Performed by: INTERNAL MEDICINE

## 2024-08-11 PROCEDURE — 80307 DRUG TEST PRSMV CHEM ANLYZR: CPT

## 2024-08-11 PROCEDURE — 36415 COLL VENOUS BLD VENIPUNCTURE: CPT

## 2024-08-11 PROCEDURE — 99284 EMERGENCY DEPT VISIT MOD MDM: CPT

## 2024-08-11 PROCEDURE — 96375 TX/PRO/DX INJ NEW DRUG ADDON: CPT

## 2024-08-11 PROCEDURE — 96361 HYDRATE IV INFUSION ADD-ON: CPT

## 2024-08-11 PROCEDURE — 85025 COMPLETE CBC W/AUTO DIFF WBC: CPT

## 2024-08-11 PROCEDURE — 82077 ASSAY SPEC XCP UR&BREATH IA: CPT

## 2024-08-11 PROCEDURE — 80179 DRUG ASSAY SALICYLATE: CPT

## 2024-08-11 RX ORDER — MIDAZOLAM HYDROCHLORIDE 2 MG/2ML
2 INJECTION, SOLUTION INTRAMUSCULAR; INTRAVENOUS ONCE
Status: COMPLETED | OUTPATIENT
Start: 2024-08-11 | End: 2024-08-11

## 2024-08-11 RX ORDER — LORAZEPAM 2 MG/ML
2 INJECTION INTRAMUSCULAR ONCE
Status: COMPLETED | OUTPATIENT
Start: 2024-08-11 | End: 2024-08-11

## 2024-08-11 RX ORDER — MIDAZOLAM HYDROCHLORIDE 2 MG/2ML
1 INJECTION, SOLUTION INTRAMUSCULAR; INTRAVENOUS ONCE
Status: DISCONTINUED | OUTPATIENT
Start: 2024-08-11 | End: 2024-08-11

## 2024-08-11 RX ADMIN — MIDAZOLAM 2 MG: 1 INJECTION INTRAMUSCULAR; INTRAVENOUS at 21:09

## 2024-08-11 RX ADMIN — LORAZEPAM 2 MG: 2 INJECTION INTRAMUSCULAR; INTRAVENOUS at 18:31

## 2024-08-11 RX ADMIN — MIDAZOLAM 2 MG: 1 INJECTION INTRAMUSCULAR; INTRAVENOUS at 18:41

## 2024-08-11 RX ADMIN — SODIUM CHLORIDE 1000 ML: 0.9 INJECTION, SOLUTION INTRAVENOUS at 18:40

## 2024-08-11 NOTE — ED PROVIDER NOTES
"History  Chief Complaint   Patient presents with    Drug Problem     Patient arrives with EMS with involuntary movements; patient says she took a Fentanyl \"pill\" but has never felt like this before; patient A&O x4     34-year-old female presents today with concerns of overdose.  Patient states that she took \"a fentanyl pill\".  States that she feels pretty good.  Patient states that her heart rate is always high when she gives blood.  States she did not take any other meds and does not have chest pain at this time.        Prior to Admission Medications   Prescriptions Last Dose Informant Patient Reported? Taking?   medroxyPROGESTERone (DEPO-PROVERA) 150 mg/mL injection   No No   Sig: Inject 1 mL (150 mg total) into a muscle every 3 (three) months   naloxone (NARCAN) 4 mg/0.1 mL nasal spray   No No   Sig: Administer 1 spray into a nostril. If no response after 2-3 minutes, give another dose in the other nostril using a new spray.   naproxen (NAPROSYN) 250 mg tablet   No No   Sig: Take 1 tablet (250 mg total) by mouth 3 (three) times a day with meals for 7 days      Facility-Administered Medications: None       Past Medical History:   Diagnosis Date    Abnormal Pap smear of cervix     2019 HSIL pap, colpo CIN3; LEEP 6/2020; 6/2021 NILM pap/neg HPV    Chlamydia 2016    Depression     no meds    Fibromyalgia     no meds       Past Surgical History:   Procedure Laterality Date    MA COLPOSCOPY CERVIX VAG LOOP ELTRD BX CERVIX N/A 6/12/2020    Procedure: BIOPSY LEEP CERVIX;  Surgeon: Jeremias Allisno MD;  Location:  MAIN OR;  Service: Gynecology    WISDOM TOOTH EXTRACTION         Family History   Problem Relation Age of Onset    Asthma Mother     Asthma Sister     Cancer Maternal Uncle         lung    Breast cancer Neg Hx     Colon cancer Neg Hx     Ovarian cancer Neg Hx      I have reviewed and agree with the history as documented.    E-Cigarette/Vaping    E-Cigarette Use Never User      E-Cigarette/Vaping Substances "     Social History     Tobacco Use    Smoking status: Every Day     Current packs/day: 0.25     Types: Cigarettes    Smokeless tobacco: Current   Vaping Use    Vaping status: Never Used   Substance Use Topics    Alcohol use: Yes     Comment: occ    Drug use: Yes     Types: Marijuana     Comment: intermittent use       Review of Systems   Unable to perform ROS: Mental status change   Cardiovascular:  Negative for chest pain and palpitations.   Gastrointestinal:  Negative for abdominal pain and vomiting.   All other systems reviewed and are negative.      Physical Exam  Physical Exam  Vitals and nursing note reviewed.   Constitutional:       General: She is in acute distress.      Appearance: She is well-developed. She is ill-appearing.   HENT:      Head: Normocephalic and atraumatic.   Eyes:      Conjunctiva/sclera: Conjunctivae normal.   Cardiovascular:      Rate and Rhythm: Regular rhythm. Tachycardia present.      Heart sounds: No murmur heard.  Pulmonary:      Effort: Pulmonary effort is normal. No respiratory distress.      Breath sounds: Normal breath sounds.   Abdominal:      Palpations: Abdomen is soft.      Tenderness: There is no abdominal tenderness.   Musculoskeletal:         General: No swelling.      Cervical back: Neck supple.      Right lower leg: No edema.      Left lower leg: No edema.   Skin:     General: Skin is warm and dry.      Capillary Refill: Capillary refill takes less than 2 seconds.   Neurological:      Mental Status: She is alert.      Comments: Patient unable to sit still, consistent with possession by spirits vs stimulant intoxication   Psychiatric:         Mood and Affect: Mood normal.         Vital Signs  ED Triage Vitals [08/11/24 1823]   Temperature Pulse Respirations Blood Pressure SpO2   98.6 °F (37 °C) (!) 141 22 142/88 92 %      Temp Source Heart Rate Source Patient Position - Orthostatic VS BP Location FiO2 (%)   Tympanic Monitor Lying Right arm --      Pain Score       --            Vitals:    08/11/24 1823 08/11/24 1843 08/11/24 2016 08/11/24 2116   BP: 142/88  122/75 137/91   Pulse: (!) 141 (!) 126 86 91   Patient Position - Orthostatic VS: Lying  Lying Lying         Visual Acuity      ED Medications  Medications   LORazepam (ATIVAN) injection 2 mg (2 mg Intravenous Given 8/11/24 1831)   sodium chloride 0.9 % bolus 1,000 mL (0 mL Intravenous Stopped 8/11/24 2106)   midazolam (VERSED) injection 2 mg (2 mg Intravenous Given 8/11/24 1841)   midazolam (VERSED) injection 2 mg (2 mg Intravenous Given 8/11/24 2109)       Diagnostic Studies  Results Reviewed       Procedure Component Value Units Date/Time    Rapid drug screen, urine [[id number]]  (Abnormal) Collected: 08/11/24 2105    Lab Status: Final result Specimen: Urine, Clean Catch Updated: 08/11/24 2130     Amph/Meth UR Positive     Barbiturate Ur Negative     Benzodiazepine Urine Positive     Cocaine Urine Negative     Methadone Urine Negative     Opiate Urine Negative     PCP Ur Negative     THC Urine Positive     Oxycodone Urine Negative     Fentanyl Urine Positive     HYDROCODONE URINE Negative    Narrative:      Presumptive report. If requested, specimen will be sent to reference lab for confirmation.  FOR MEDICAL PURPOSES ONLY.   IF CONFIRMATION NEEDED PLEASE CONTACT THE LAB WITHIN 5 DAYS.    Drug Screen Cutoff Levels:  AMPHETAMINE/METHAMPHETAMINES  1000 ng/mL  BARBITURATES     200 ng/mL  BENZODIAZEPINES     200 ng/mL  COCAINE      300 ng/mL  METHADONE      300 ng/mL  OPIATES      300 ng/mL  PHENCYCLIDINE     25 ng/mL  THC       50 ng/mL  OXYCODONE      100 ng/mL  FENTANYL      5 ng/mL  HYDROCODONE     300 ng/mL    Comprehensive metabolic panel [[id number]]  (Abnormal) Collected: 08/11/24 1831    Lab Status: Final result Specimen: Blood from Arm, Left Updated: 08/11/24 1903     Sodium 146 mmol/L      Potassium 3.9 mmol/L      Chloride 109 mmol/L      CO2 24 mmol/L      ANION GAP 13 mmol/L      BUN 15 mg/dL      Creatinine 1.14 mg/dL       Glucose 106 mg/dL      Calcium 9.6 mg/dL      AST 33 U/L      ALT 23 U/L      Alkaline Phosphatase 48 U/L      Total Protein 7.0 g/dL      Albumin 4.5 g/dL      Total Bilirubin 0.76 mg/dL      eGFR 62 ml/min/1.73sq m     Narrative:      National Kidney Disease Foundation guidelines for Chronic Kidney Disease (CKD):     Stage 1 with normal or high GFR (GFR > 90 mL/min/1.73 square meters)    Stage 2 Mild CKD (GFR = 60-89 mL/min/1.73 square meters)    Stage 3A Moderate CKD (GFR = 45-59 mL/min/1.73 square meters)    Stage 3B Moderate CKD (GFR = 30-44 mL/min/1.73 square meters)    Stage 4 Severe CKD (GFR = 15-29 mL/min/1.73 square meters)    Stage 5 End Stage CKD (GFR <15 mL/min/1.73 square meters)  Note: GFR calculation is accurate only with a steady state creatinine    Salicylate level [[id number]]  (Normal) Collected: 08/11/24 1831    Lab Status: Final result Specimen: Blood from Arm, Left Updated: 08/11/24 1903     Salicylate Lvl <5 mg/dL     Acetaminophen level-If concentration is detectable, please discuss with medical  on call. [[id number]]  (Abnormal) Collected: 08/11/24 1831    Lab Status: Final result Specimen: Blood from Arm, Left Updated: 08/11/24 1903     Acetaminophen Level <2 ug/mL     Ethanol [[id number]]  (Normal) Collected: 08/11/24 1831    Lab Status: Final result Specimen: Blood from Arm, Left Updated: 08/11/24 1901     Ethanol Lvl <10 mg/dL     CBC and differential [[id number]]  (Abnormal) Collected: 08/11/24 1831    Lab Status: Final result Specimen: Blood from Arm, Left Updated: 08/11/24 1844     WBC 23.75 Thousand/uL      RBC 3.63 Million/uL      Hemoglobin 11.8 g/dL      Hematocrit 36.4 %       fL      MCH 32.5 pg      MCHC 32.4 g/dL      RDW 12.7 %      MPV 8.8 fL      Platelets 364 Thousands/uL      nRBC 0 /100 WBCs      Segmented % 80 %      Immature Grans % 1 %      Lymphocytes % 11 %      Monocytes % 8 %      Eosinophils Relative 0 %      Basophils Relative 0 %       "Absolute Neutrophils 19.06 Thousands/µL      Absolute Immature Grans 0.14 Thousand/uL      Absolute Lymphocytes 2.54 Thousands/µL      Absolute Monocytes 1.92 Thousand/µL      Eosinophils Absolute 0.01 Thousand/µL      Basophils Absolute 0.08 Thousands/µL                    No orders to display              Procedures  Procedures         ED Course  ED Course as of 08/11/24 2135   Sun Aug 11, 2024   1849 EKG reviewed by me, normal sinus rhythm rate of 126, sinus tachycardia.  Normal axis.  Elevation of ST to T in V3.  No changes in V4 or V2.   2008 WBC(!): 23.75  Likely reactive secondary to intoxication from stimulant   2008 Chloride(!): 109   2008 Creatinine: 1.14   2008 GFR, Calculated: 62   2106 Patient woke up, still making erratic movements and very unsteady on her feet.  2 more Versed ordered.  Will continue to hydrate.  UDS.   2130 AMPH/METH(!): Positive   2130 BENZODIAZEPINE URINE(!): Positive   2130 THC URINE(!): Positive   2130 FENTANYL URINE(!): Positive   2130 AMPH/METH(!): Positive   2135 Repeat EKG, at a normal rate, improved V3, no ST-T changes.  Suspect previous rate related changes                                               Medical Decision Making  34-year-old female presenting today for drug overdose and erratic movements.  Patient states she took\" fentanyl pill\" but states that this feels different when she feels good.  States she has some back pain, same as previous.  Denies any chest pain.  Patient with signs of stimulant toxicity including tachycardia, erratic movements and inattention.  Benzos.  With patient is calm, will collect lab work, hydrate, EKG.  Lab workup revealing mild REG, elevated white blood cell count.  Lab workup with mild hyperchloremia ethanol negative.  UDS showing positive for amphetamines.  High suspicion this is what patient took.  Pending patient sobriety patient be signed out to attending.    Amount and/or Complexity of Data Reviewed  Labs: ordered. Decision-making " details documented in ED Course.    Risk  Prescription drug management.                 Disposition  Final diagnoses:   Drug abuse (HCC)   Involuntary movements   Altered mental status   REG (acute kidney injury) (HCC)   Hyperchloremia     Time reflects when diagnosis was documented in both MDM as applicable and the Disposition within this note       Time User Action Codes Description Comment    8/11/2024  6:49 PM Gilbert Smart [F44.89] Tamiko possession     8/11/2024  6:49 PM Gilbert Smart [F19.10] Drug abuse (HCC)     8/11/2024  6:49 PM Gilbert Smart [R25.9] Involuntary movements     8/11/2024  6:49 PM Gilbert Smart [R41.82] Altered mental status     8/11/2024  7:07 PM Gilbert Smart [N17.9] REG (acute kidney injury) (HCC)     8/11/2024  7:07 PM Gilbert Smart [E87.8] Hyperchloremia     8/11/2024  9:07 PM Gilbert Smart Modify [F19.10] Drug abuse (HCC)     8/11/2024  9:07 PM Gilbert Smart Remove [F44.89] Tamiko possession           ED Disposition       None          Follow-up Information    None         Patient's Medications   Discharge Prescriptions    No medications on file       No discharge procedures on file.    PDMP Review         Value Time User    PDMP Reviewed  Yes 5/12/2023 10:23 AM Elton Lechuga MD            ED Provider  Electronically Signed by             Gilbert Smart PA-C  08/11/24 4725

## 2024-09-03 ENCOUNTER — TELEPHONE (OUTPATIENT)
Dept: OBGYN CLINIC | Facility: CLINIC | Age: 34
End: 2024-09-03

## 2024-12-11 ENCOUNTER — TELEPHONE (OUTPATIENT)
Dept: OBGYN CLINIC | Facility: CLINIC | Age: 34
End: 2024-12-11

## 2025-03-04 ENCOUNTER — TELEPHONE (OUTPATIENT)
Dept: OBGYN CLINIC | Facility: CLINIC | Age: 35
End: 2025-03-04

## 2025-03-04 NOTE — TELEPHONE ENCOUNTER
VOICE MESSAGE:  Hi yes my name is Saumya Denis and I was calling to see if I can get an earlier appointment. I know y'all gave it to me for March 14th but I need to come in really bad this week so can you please give me a call back at [phone], [phone] Thank you.    Called Pt and left voice message to contact our office per her voice message.

## 2025-04-07 ENCOUNTER — TELEPHONE (OUTPATIENT)
Dept: OBGYN CLINIC | Facility: CLINIC | Age: 35
End: 2025-04-07

## 2025-04-07 NOTE — TELEPHONE ENCOUNTER
Voicemail:Hi, my name is Saumya Denis and I was calling to get a reschedule of my appointment today. Actually y'all sent me a message saying I didn't reschedule it. Can you please give me a call back at [phone]? [phone] Thank you.      Returned patient's call offered appt with Nikunj for yearly and pt has been scheduled for 4/22/25.

## 2025-04-07 NOTE — TELEPHONE ENCOUNTER
Called patient to reschedule appt she had on 4/7/25 Vanessa out sick . Left voicemail for patient to call the office.

## 2025-04-21 ENCOUNTER — HOSPITAL ENCOUNTER (EMERGENCY)
Facility: HOSPITAL | Age: 35
Discharge: HOME | End: 2025-04-22
Attending: INTERNAL MEDICINE
Payer: MEDICARE

## 2025-04-21 ENCOUNTER — APPOINTMENT (EMERGENCY)
Dept: ULTRASOUND IMAGING | Facility: HOSPITAL | Age: 35
End: 2025-04-21
Payer: MEDICARE

## 2025-04-21 VITALS
BODY MASS INDEX: 24.64 KG/M2 | SYSTOLIC BLOOD PRESSURE: 124 MMHG | WEIGHT: 143.52 LBS | HEART RATE: 103 BPM | RESPIRATION RATE: 16 BRPM | OXYGEN SATURATION: 99 % | TEMPERATURE: 98.1 F | DIASTOLIC BLOOD PRESSURE: 80 MMHG

## 2025-04-21 DIAGNOSIS — Z34.90 PREGNANCY: ICD-10-CM

## 2025-04-21 DIAGNOSIS — R10.9 ABDOMINAL PAIN: Primary | ICD-10-CM

## 2025-04-21 DIAGNOSIS — N39.0 UTI (URINARY TRACT INFECTION): ICD-10-CM

## 2025-04-21 LAB
ALBUMIN SERPL BCG-MCNC: 4 G/DL (ref 3.5–5)
ALP SERPL-CCNC: 43 U/L (ref 34–104)
ALT SERPL W P-5'-P-CCNC: 5 U/L (ref 7–52)
ANION GAP SERPL CALCULATED.3IONS-SCNC: 7 MMOL/L (ref 4–13)
AST SERPL W P-5'-P-CCNC: 16 U/L (ref 13–39)
B-HCG SERPL-ACNC: 8527.5 MIU/ML (ref 0–5)
BACTERIA UR QL AUTO: ABNORMAL /HPF
BASOPHILS # BLD AUTO: 0.05 THOUSANDS/ÂΜL (ref 0–0.1)
BASOPHILS NFR BLD AUTO: 0 % (ref 0–1)
BILIRUB SERPL-MCNC: 0.4 MG/DL (ref 0.2–1)
BILIRUB UR QL STRIP: NEGATIVE
BUN SERPL-MCNC: 8 MG/DL (ref 5–25)
CALCIUM SERPL-MCNC: 8.4 MG/DL (ref 8.4–10.2)
CHLORIDE SERPL-SCNC: 103 MMOL/L (ref 96–108)
CLARITY UR: CLEAR
CO2 SERPL-SCNC: 24 MMOL/L (ref 21–32)
COLOR UR: YELLOW
CREAT SERPL-MCNC: 1.01 MG/DL (ref 0.6–1.3)
EOSINOPHIL # BLD AUTO: 0.1 THOUSAND/ÂΜL (ref 0–0.61)
EOSINOPHIL NFR BLD AUTO: 1 % (ref 0–6)
ERYTHROCYTE [DISTWIDTH] IN BLOOD BY AUTOMATED COUNT: 13.4 % (ref 11.6–15.1)
EXT PREGNANCY TEST URINE: POSITIVE
EXT. CONTROL: ABNORMAL
GFR SERPL CREATININE-BSD FRML MDRD: 71 ML/MIN/1.73SQ M
GLUCOSE SERPL-MCNC: 95 MG/DL (ref 65–140)
GLUCOSE UR STRIP-MCNC: NEGATIVE MG/DL
HCT VFR BLD AUTO: 37.9 % (ref 34.8–46.1)
HGB BLD-MCNC: 12.3 G/DL (ref 11.5–15.4)
HGB UR QL STRIP.AUTO: ABNORMAL
IMM GRANULOCYTES # BLD AUTO: 0.08 THOUSAND/UL (ref 0–0.2)
IMM GRANULOCYTES NFR BLD AUTO: 1 % (ref 0–2)
KETONES UR STRIP-MCNC: ABNORMAL MG/DL
LEUKOCYTE ESTERASE UR QL STRIP: ABNORMAL
LIPASE SERPL-CCNC: 33 U/L (ref 11–82)
LYMPHOCYTES # BLD AUTO: 3.47 THOUSANDS/ÂΜL (ref 0.6–4.47)
LYMPHOCYTES NFR BLD AUTO: 31 % (ref 14–44)
MCH RBC QN AUTO: 31.9 PG (ref 26.8–34.3)
MCHC RBC AUTO-ENTMCNC: 32.5 G/DL (ref 31.4–37.4)
MCV RBC AUTO: 98 FL (ref 82–98)
MONOCYTES # BLD AUTO: 0.69 THOUSAND/ÂΜL (ref 0.17–1.22)
MONOCYTES NFR BLD AUTO: 6 % (ref 4–12)
MUCOUS THREADS UR QL AUTO: ABNORMAL
NEUTROPHILS # BLD AUTO: 6.78 THOUSANDS/ÂΜL (ref 1.85–7.62)
NEUTS SEG NFR BLD AUTO: 61 % (ref 43–75)
NITRITE UR QL STRIP: POSITIVE
NON-SQ EPI CELLS URNS QL MICRO: ABNORMAL /HPF
NRBC BLD AUTO-RTO: 0 /100 WBCS
PH UR STRIP.AUTO: 6.5 [PH] (ref 4.5–8)
PLATELET # BLD AUTO: 298 THOUSANDS/UL (ref 149–390)
PMV BLD AUTO: 9 FL (ref 8.9–12.7)
POTASSIUM SERPL-SCNC: 4 MMOL/L (ref 3.5–5.3)
PROT SERPL-MCNC: 6.6 G/DL (ref 6.4–8.4)
PROT UR STRIP-MCNC: ABNORMAL MG/DL
RBC # BLD AUTO: 3.85 MILLION/UL (ref 3.81–5.12)
RBC #/AREA URNS AUTO: ABNORMAL /HPF
SODIUM SERPL-SCNC: 134 MMOL/L (ref 135–147)
SP GR UR STRIP.AUTO: 1.02 (ref 1–1.03)
UROBILINOGEN UR QL STRIP.AUTO: 1 E.U./DL
WBC # BLD AUTO: 11.17 THOUSAND/UL (ref 4.31–10.16)
WBC #/AREA URNS AUTO: ABNORMAL /HPF

## 2025-04-21 PROCEDURE — 81001 URINALYSIS AUTO W/SCOPE: CPT

## 2025-04-21 PROCEDURE — 76801 OB US < 14 WKS SINGLE FETUS: CPT

## 2025-04-21 PROCEDURE — 83690 ASSAY OF LIPASE: CPT | Performed by: INTERNAL MEDICINE

## 2025-04-21 PROCEDURE — 87086 URINE CULTURE/COLONY COUNT: CPT

## 2025-04-21 PROCEDURE — 80053 COMPREHEN METABOLIC PANEL: CPT | Performed by: INTERNAL MEDICINE

## 2025-04-21 PROCEDURE — 87186 SC STD MICRODIL/AGAR DIL: CPT

## 2025-04-21 PROCEDURE — 85025 COMPLETE CBC W/AUTO DIFF WBC: CPT | Performed by: INTERNAL MEDICINE

## 2025-04-21 PROCEDURE — 99284 EMERGENCY DEPT VISIT MOD MDM: CPT | Performed by: INTERNAL MEDICINE

## 2025-04-21 PROCEDURE — 87077 CULTURE AEROBIC IDENTIFY: CPT

## 2025-04-21 PROCEDURE — 81025 URINE PREGNANCY TEST: CPT

## 2025-04-21 PROCEDURE — 36415 COLL VENOUS BLD VENIPUNCTURE: CPT | Performed by: INTERNAL MEDICINE

## 2025-04-21 PROCEDURE — 99284 EMERGENCY DEPT VISIT MOD MDM: CPT

## 2025-04-21 PROCEDURE — 84702 CHORIONIC GONADOTROPIN TEST: CPT | Performed by: INTERNAL MEDICINE

## 2025-04-22 ENCOUNTER — ANNUAL EXAM (OUTPATIENT)
Dept: OBGYN CLINIC | Facility: CLINIC | Age: 35
End: 2025-04-22

## 2025-04-22 VITALS — BODY MASS INDEX: 23.86 KG/M2 | SYSTOLIC BLOOD PRESSURE: 102 MMHG | DIASTOLIC BLOOD PRESSURE: 80 MMHG | WEIGHT: 139 LBS

## 2025-04-22 DIAGNOSIS — Z34.90 EARLY STAGE OF PREGNANCY: ICD-10-CM

## 2025-04-22 DIAGNOSIS — Z11.3 SCREEN FOR STD (SEXUALLY TRANSMITTED DISEASE): ICD-10-CM

## 2025-04-22 DIAGNOSIS — Z01.419 ENCOUNTER FOR ANNUAL ROUTINE GYNECOLOGICAL EXAMINATION: Primary | ICD-10-CM

## 2025-04-22 PROCEDURE — 87591 N.GONORRHOEAE DNA AMP PROB: CPT | Performed by: NURSE PRACTITIONER

## 2025-04-22 PROCEDURE — G0476 HPV COMBO ASSAY CA SCREEN: HCPCS | Performed by: NURSE PRACTITIONER

## 2025-04-22 PROCEDURE — G0145 SCR C/V CYTO,THINLAYER,RESCR: HCPCS | Performed by: NURSE PRACTITIONER

## 2025-04-22 PROCEDURE — 87491 CHLMYD TRACH DNA AMP PROBE: CPT | Performed by: NURSE PRACTITIONER

## 2025-04-22 PROCEDURE — 99395 PREV VISIT EST AGE 18-39: CPT | Performed by: NURSE PRACTITIONER

## 2025-04-22 RX ORDER — CEPHALEXIN 500 MG/1
500 CAPSULE ORAL EVERY 12 HOURS SCHEDULED
Qty: 14 CAPSULE | Refills: 0 | Status: SHIPPED | OUTPATIENT
Start: 2025-04-22 | End: 2025-04-29

## 2025-04-22 RX ORDER — PNV NO.95/FERROUS FUM/FOLIC AC 28MG-0.8MG
1 TABLET ORAL DAILY
Qty: 90 TABLET | Refills: 3 | Status: SHIPPED | OUTPATIENT
Start: 2025-04-22

## 2025-04-22 RX ADMIN — CEPHALEXIN 500 MG: 250 CAPSULE ORAL at 00:09

## 2025-04-22 NOTE — PROGRESS NOTES
Annual Exam    Assessment   1. Encounter for annual routine gynecological examination  Liquid-based pap, screening      2. Screen for STD (sexually transmitted disease)  Chlamydia/GC amplified DNA by PCR    RPR-Syphilis Screening (Total Syphilis IGG/IGM)    HIV 1/2 AG/AB W REFLEX [facility] and [facility] only    Hepatitis B surface antigen    Hepatitis C antibody      3. Early stage of pregnancy  Prenatal Vit-Fe Fumarate-FA (prenatal vitamin) 28-0.8 mg        well woman       Plan       All questions answered.  Breast self exam technique reviewed and patient encouraged to perform self-exam monthly.  Chlamydia specimen.  Contraception: none.  Discussed healthy lifestyle modifications.  Follow up in 1 year.  GC specimen.  Thin prep Pap smear.     Patient Instructions   PAP results can take up to 2 weeks  STD resylts can take a few days  Schedule viability based on LMP of 3/15/2025  Call with needs or concerns  Start Prenatal vitamins today  Pt verbalized understanding of all discussed.      Subjective      Araceli Denis is a 34 y.o.  female who presents for annual well woman exam. Periods are regular every 28-30 days, lasting 5 days. No intermenstrual bleeding, spotting, or discharge.  6/10/2021 WNL PAP and negative HPV  LMP was 3/125/2025, pt states she was in the ED yesterday, had a positive pregnancy test and was treated for a UTI, advised to continue medication, cephalexin as directed  Pt was unsure if she would continue the pregnancy, options were discusses, when the visited ended pt states she will continue the pregnancy, advised to start prenatal vitamins    Current contraception: none  [id number] of abnormal Pap smear: yes - 7/15/2019 HGSIL, JUSTIN II-III on colpo, LEEP 2020  Family history of uterine or ovarian cancer: no  Regular self breast exam: yes  History of abnormal mammogram: N/A  Family history of breast cancer: no  History of abnormal lipids: unknown  Menstrual History:  OB History           3    Para   3    Term   2       1    AB   0    Living   3         SAB   0    IAB   0    Ectopic   0    Multiple   0    Live Births   3                Menarche age: 10  Patient's last menstrual period was 03/15/2025 (approximate).       The following portions of the patient's history were reviewed and updated as appropriate: allergies, current medications, past family history, past medical history, past social history, past surgical history and problem list.    Review of Systems  Pertinent items are noted in HPI.      Objective      /80 (BP Location: Left arm, Patient Position: Sitting, Cuff Size: Standard)   Wt 63 kg (139 lb)   LMP 03/15/2025 (Approximate)   BMI 23.86 kg/m²     General: alert and oriented, in no acute distress, alert, appears stated age, and cooperative   Heart: NSR   Lungs: clear to auscultation bilaterally, WNL respiratory effort, negative cough or SOB   Thyroid: Negative masses palpable   Abdomen: soft, non-tender, without masses or organomegaly palpable   Vulva: normal   Vagina: normal mucosa   Cervix: no bleeding following Pap, no cervical motion tenderness, and no lesions   Uterus: normal size, non-tender, normal shape and consistency   Adnexa: normal adnexa   Urethra: normal   Breasts: Tender, early stage of pregnancy,negative masses palpable, negative discharge, or dimpling

## 2025-04-22 NOTE — DISCHARGE INSTRUCTIONS
You are found to have positive pregnancy test and hCG that was elevated, this corresponds with pregnancy.  Your ultrasound was inconclusive.  It is important that you follow-up with OB/GYN in 2 to 3 days for repeat hCG and repeat ultrasound.  Is important that ectopic pregnancy is ruled out.  If you have severe worsening of abdominal pain or severe symptoms, you should return to the ER for evaluation    US OB < 14 weeks with transvaginal  Result Date: 4/22/2025  Impression: An intrauterine gestational sac, containing a yolk sac within, is present within the fundal portion of the endometrial canal. A discrete fetal pole is not definitely demonstrable at the present time. The gestational sac measures 10 mm, corresponding to an estimated gestational age of 5 weeks 0 days. This most likely represents early intrauterine gestation too small to visualize. As blighted ovum and ectopic pregnancy cannot be entirely excluded, close clinical correlation and correlation with serial quantitative beta hCG results is necessary. OB/GYN consultation and follow-up is also recommended. Short-term ultrasound follow-up recommended. This examination demonstrates findings for which clinical and imaging follow-up is recommended and was logged as such in EPIC. The study was marked in EPIC for immediate notification. Workstation performed: [id number]

## 2025-04-22 NOTE — PATIENT INSTRUCTIONS
PAP results can take up to 2 weeks  STD resylts can take a few days  Schedule viability based on LMP of 3/15/2025  Call with needs or concerns  Start Prenatal vitamins today  
[facility]

## 2025-04-22 NOTE — ED PROVIDER NOTES
Time reflects when diagnosis was documented in both MDM as applicable and the Disposition within this note       Time User Action Codes Description Comment    4/22/2025 12:04 AM Vane Honey Add [R10.9] Abdominal pain     4/22/2025 12:05 AM Vane Honey Add [Z34.90] Pregnancy     4/22/2025 12:05 AM VaneHoney alvarez Add [N39.0] UTI (urinary tract infection)           ED Disposition       ED Disposition   Discharge    Condition   Stable    Date/Time   Tue Apr 22, 2025 12:04 AM    Comment   Ladonnahamzah Denis discharge to home/self care.                   Assessment & Plan       Medical Decision Making  Work-up to include blood work, CBC as marker of infectivity, hemoconcentration for hydration status, CMP to check for electrolytes, renal function, liver function, Lipase to check for pancreatitis, UA to rule out UTI, pregnancy test to rule out pregnancy if positive will need hCG and transvaginal ultrasound to rule out ectopic pregnancy, although this early it would likely be inconclusive.    I discussed results in detail with patient including UA, blood work and ultrasound.  She understands that ultrasound was inconclusive and she will need repeat hCG and ultrasound to rule out ectopic, intrauterine pregnancy versus blighted ovum.  She understands he needs to take antibiotics for the treatment of UTI.  She understands return precautions including severe abdominal pain, severe vaginal bleeding or development of new symptoms.    Amount and/or Complexity of Data Reviewed  Labs: ordered. Decision-making details documented in ED Course.  Radiology: ordered.    Risk  Prescription drug management.        ED Course as of 04/22/25 0034   Mon Apr 21, 2025   2230 B+ blood type on review    2350 WBC(!): 11.17   2350 Hemoglobin: 12.3   2350 Platelet Count: 298  CMP grossly unremarkable   2350 HCG QUANTITATIVE(!): 8,527.5   2350 Leukocytes, UA(!): Trace   2350 Nitrite, UA(!): Positive   2350 WBC, UA(!): 30-50        Medications   cephalexin (KEFLEX) capsule 500 mg (500 mg Oral Given 4/22/25 0009)       ED Risk Strat Scores                    No data recorded        SBIRT 20yo+      Flowsheet Row Most Recent Value   Initial Alcohol Screen: US AUDIT-C     1. How often do you have a drink containing alcohol? 0 Filed at: 04/21/2025 2122   2. How many drinks containing alcohol do you have on a typical day you are drinking?  0 Filed at: 04/21/2025 2122   3a. Male UNDER 65: How often do you have five or more drinks on one occasion? 0 Filed at: 04/21/2025 2122   3b. FEMALE Any Age, or MALE 65+: How often do you have 4 or more drinks on one occassion? 0 Filed at: 04/21/2025 2122   Audit-C Score 0 Filed at: 04/21/2025 2122   DEACON: How many times in the past year have you...    Used an illegal drug or used a prescription medication for non-medical reasons? Never Filed at: 04/21/2025 2122                            History of Present Illness       Chief Complaint   Patient presents with    Abdominal Pain     Pt reports right lower abdominal pain x3 weeks. Pt reports breast pain.       Past Medical History:   Diagnosis Date    Abnormal Pap smear of cervix     2019 HSIL pap, colpo CIN3; LEEP 6/2020; 6/2021 NILM pap/neg HPV    Chlamydia 2016    Depression     no meds    Fibromyalgia     no meds      Past Surgical History:   Procedure Laterality Date    IL COLPOSCOPY CERVIX VAG LOOP ELTRD BX CERVIX N/A 6/12/2020    Procedure: BIOPSY LEEP CERVIX;  Surgeon: Jeremias Allison MD;  Location:  MAIN OR;  Service: Gynecology    WISDOM TOOTH EXTRACTION        Family History   Problem Relation Age of Onset    Asthma Mother     Asthma Sister     Cancer Maternal Uncle         lung    Breast cancer Neg Hx     Colon cancer Neg Hx     Ovarian cancer Neg Hx       Social History     Tobacco Use    Smoking status: Every Day     Current packs/day: 0.25     Types: Cigarettes    Smokeless tobacco: Current   Vaping Use    Vaping status: Never Used   Substance  Use Topics    Alcohol use: Yes     Comment: occ    Drug use: Yes     Types: Marijuana     Comment: intermittent use      E-Cigarette/Vaping    E-Cigarette Use Never User       E-Cigarette/Vaping Substances      I have reviewed and agree with the history as documented.     34-year-old woman presents to ED for evaluation of lower abdominal pain for 3 weeks and sore breast.  She reports she had her menstrual cycle 4 weeks ago.  She is sexually active.  She is not concerned about STDs.  She is concerned she has UTI.  No vaginal discharge or vaginal bleeding.  No back pain.  No nausea or vomiting.  No fevers or chills.  No other complaints or concerns.      Abdominal Pain      Review of Systems   Gastrointestinal:  Positive for abdominal pain.           Objective       ED Triage Vitals [04/21/25 2121]   Temperature Pulse Blood Pressure Respirations SpO2 Patient Position - Orthostatic VS   98.1 °F (36.7 °C) 103 124/80 16 99 % Sitting      Temp Source Heart Rate Source BP Location FiO2 (%) Pain Score    Oral Monitor Right arm -- --      Vitals      Date and Time Temp Pulse SpO2 Resp BP Pain Score FACES Pain Rating User   04/21/25 2121 98.1 °F (36.7 °C) 103 99 % 16 124/80 -- -- SR            Physical Exam  PHYSICAL EXAM    Constitutional:  Well developed, no acute distress  HEENT:  Conjunctiva normal. Oropharynx moist  Respiratory:  No respiratory distress  Cardiovascular:  Normal rate  GI:  Soft, nondistended, nontender  :  No costovertebral angle tenderness   Musculoskeletal:  No edema, no tenderness, no deformities  Integument:  Well hydrated, no rash   Lymphatic:  No lymphadenopathy noted   Neurologic:  Alert & oriented x 3, normal motor function, no focal deficits noted   Psychiatric:  Speech and behavior appropriate       Results Reviewed       Procedure Component Value Units Date/Time    hCG, quantitative [[id number]]  (Abnormal) Collected: 04/21/25 2246    Lab Status: Final result Specimen: Blood from Arm, Left  Updated: 04/21/25 2342     HCG, Quant 8,527.5 mIU/mL     Narrative:       Expected Ranges:    HCG results between 5.0 and 25.0 mIU/mL may be indicative of early pregnancy but should be interpreted in light of the total clinical presentation.    HCG can rise to detectable levels in ye and post menopausal women (0-11.6 mIU/mL).     Approximate               Approximate HCG  Gestation age          Concentration ( mIU/mL)  _____________          ______________________   Weeks                      HCG values  0.2-1                       5-50  1-2                           2-3                         100-5000  3-4                         500-08390  4-5                         1000-48881  5-6                         [id number]  6-8                         46429-539345  8-12                        [id number]      CMP [[id number]]  (Abnormal) Collected: 04/21/25 2246    Lab Status: Final result Specimen: Blood from Arm, Left Updated: 04/21/25 2313     Sodium 134 mmol/L      Potassium 4.0 mmol/L      Chloride 103 mmol/L      CO2 24 mmol/L      ANION GAP 7 mmol/L      BUN 8 mg/dL      Creatinine 1.01 mg/dL      Glucose 95 mg/dL      Calcium 8.4 mg/dL      AST 16 U/L      ALT 5 U/L      Alkaline Phosphatase 43 U/L      Total Protein 6.6 g/dL      Albumin 4.0 g/dL      Total Bilirubin 0.40 mg/dL      eGFR 71 ml/min/1.73sq m     Narrative:      National Kidney Disease Foundation guidelines for Chronic Kidney Disease (CKD):     Stage 1 with normal or high GFR (GFR > 90 mL/min/1.73 square meters)    Stage 2 Mild CKD (GFR = 60-89 mL/min/1.73 square meters)    Stage 3A Moderate CKD (GFR = 45-59 mL/min/1.73 square meters)    Stage 3B Moderate CKD (GFR = 30-44 mL/min/1.73 square meters)    Stage 4 Severe CKD (GFR = 15-29 mL/min/1.73 square meters)    Stage 5 End Stage CKD (GFR <15 mL/min/1.73 square meters)  Note: GFR calculation is accurate only with a steady state creatinine    Lipase [[id number]]  (Normal) Collected:  04/21/25 2246    Lab Status: Final result Specimen: Blood from Arm, Left Updated: 04/21/25 2313     Lipase 33 u/L     CBC and differential [[id number]]  (Abnormal) Collected: 04/21/25 2246    Lab Status: Final result Specimen: Blood from Arm, Left Updated: 04/21/25 2251     WBC 11.17 Thousand/uL      RBC 3.85 Million/uL      Hemoglobin 12.3 g/dL      Hematocrit 37.9 %      MCV 98 fL      MCH 31.9 pg      MCHC 32.5 g/dL      RDW 13.4 %      MPV 9.0 fL      Platelets 298 Thousands/uL      nRBC 0 /100 WBCs      Segmented % 61 %      Immature Grans % 1 %      Lymphocytes % 31 %      Monocytes % 6 %      Eosinophils Relative 1 %      Basophils Relative 0 %      Absolute Neutrophils 6.78 Thousands/µL      Absolute Immature Grans 0.08 Thousand/uL      Absolute Lymphocytes 3.47 Thousands/µL      Absolute Monocytes 0.69 Thousand/µL      Eosinophils Absolute 0.10 Thousand/µL      Basophils Absolute 0.05 Thousands/µL     Urine Microscopic [[id number]]  (Abnormal) Collected: 04/21/25 2127    Lab Status: Final result Specimen: Urine, Clean Catch Updated: 04/21/25 2155     RBC, UA 10-20 /hpf      WBC, UA 30-50 /hpf      Epithelial Cells Occasional /hpf      Bacteria, UA Occasional /hpf      MUCUS THREADS Innumerable    Urine culture [[id number]] Collected: 04/21/25 2127    Lab Status: In process Specimen: Urine, Clean Catch Updated: 04/21/25 2155    POCT pregnancy, urine [[id number]]  (Abnormal) Collected: 04/21/25 2130    Lab Status: Final result Updated: 04/21/25 2130     EXT Preg Test, Ur Positive     Control Valid    Urine Macroscopic, POC [[id number]]  (Abnormal) Collected: 04/21/25 2127    Lab Status: Final result Specimen: Urine Updated: 04/21/25 2129     Color, UA Yellow     Clarity, UA Clear     pH, UA 6.5     Leukocytes, UA Trace     Nitrite, UA Positive     Protein, UA 30 (1+) mg/dl      Glucose, UA Negative mg/dl      Ketones, UA Trace mg/dl      Urobilinogen, UA 1.0 E.U./dl      Bilirubin, UA Negative     Occult Blood, UA  Small     Specific Gravity, UA 1.020    Narrative:      CLINITEK RESULT            US OB < 14 weeks with transvaginal   Final Interpretation by Cynthia Art MD (04/22 0001)      An intrauterine gestational sac, containing a yolk sac within, is present within the fundal portion of the endometrial canal. A discrete fetal pole is not definitely demonstrable at the present time. The gestational sac measures 10 mm, corresponding to    an estimated gestational age of 5 weeks 0 days. This most likely represents early intrauterine gestation too small to visualize. As blighted ovum and ectopic pregnancy cannot be entirely excluded, close clinical correlation and correlation with serial    quantitative beta hCG results is necessary. OB/GYN consultation and follow-up is also recommended. Short-term ultrasound follow-up recommended.      This examination demonstrates findings for which clinical and imaging follow-up is recommended and was logged as such in EPIC.      The study was marked in EPIC for immediate notification.            Workstation performed: [id number]             Procedures    ED Medication and Procedure Management   Prior to Admission Medications   Prescriptions Last Dose Informant Patient Reported? Taking?   medroxyPROGESTERone (DEPO-PROVERA) 150 mg/mL injection   No No   Sig: Inject 1 mL (150 mg total) into a muscle every 3 (three) months   naloxone (NARCAN) 4 mg/0.1 mL nasal spray   No No   Sig: Administer 1 spray into a nostril. If no response after 2-3 minutes, give another dose in the other nostril using a new spray.   naproxen (NAPROSYN) 250 mg tablet   No No   Sig: Take 1 tablet (250 mg total) by mouth 3 (three) times a day with meals for 7 days      Facility-Administered Medications: None     Discharge Medication List as of 4/22/2025 12:10 AM        START taking these medications    Details   cephalexin (KEFLEX) 500 mg capsule Take 1 capsule (500 mg total) by mouth every 12 (twelve) hours for 7  days, Starting Tue 4/22/2025, Until Tue 4/29/2025, Normal           CONTINUE these medications which have NOT CHANGED    Details   medroxyPROGESTERone (DEPO-PROVERA) 150 mg/mL injection Inject 1 mL (150 mg total) into a muscle every 3 (three) months, Starting Fri 6/17/2022, Normal      naloxone (NARCAN) 4 mg/0.1 mL nasal spray Administer 1 spray into a nostril. If no response after 2-3 minutes, give another dose in the other nostril using a new spray., Normal      naproxen (NAPROSYN) 250 mg tablet Take 1 tablet (250 mg total) by mouth 3 (three) times a day with meals for 7 days, Starting Sat 4/6/2024, Until Sat 4/13/2024, Normal             ED SEPSIS DOCUMENTATION   Time reflects when diagnosis was documented in both MDM as applicable and the Disposition within this note       Time User Action Codes Description Comment    4/22/2025 12:04 AM Honey Cifuentes Add [R10.9] Abdominal pain     4/22/2025 12:05 AM Honey Cifuentes Add [Z34.90] Pregnancy     4/22/2025 12:05 AM Honey Cifuentes Add [N39.0] UTI (urinary tract infection)                  Honey Cifuentes MD  04/22/25 0034

## 2025-04-23 LAB
HPV HR 12 DNA CVX QL NAA+PROBE: NEGATIVE
HPV16 DNA CVX QL NAA+PROBE: NEGATIVE
HPV18 DNA CVX QL NAA+PROBE: NEGATIVE

## 2025-04-24 LAB
BACTERIA UR CULT: ABNORMAL
C TRACH DNA SPEC QL NAA+PROBE: NEGATIVE
N GONORRHOEA DNA SPEC QL NAA+PROBE: NEGATIVE

## 2025-04-25 LAB
LAB AP GYN PRIMARY INTERPRETATION: NORMAL
Lab: NORMAL
PATH INTERP SPEC-IMP: NORMAL

## 2025-05-14 ENCOUNTER — ULTRASOUND (OUTPATIENT)
Dept: OBGYN CLINIC | Facility: CLINIC | Age: 35
End: 2025-05-14

## 2025-05-14 VITALS — DIASTOLIC BLOOD PRESSURE: 60 MMHG | WEIGHT: 146.8 LBS | SYSTOLIC BLOOD PRESSURE: 90 MMHG | BODY MASS INDEX: 25.2 KG/M2

## 2025-05-14 DIAGNOSIS — Z32.01 POSITIVE PREGNANCY TEST: Primary | ICD-10-CM

## 2025-05-14 PROBLEM — F11.90 OPIOID USE DISORDER: Status: RESOLVED | Noted: 2023-05-11 | Resolved: 2025-05-14

## 2025-05-14 RX ORDER — VITAMIN A ACETATE, .BETA.-CAROTENE, ASCORBIC ACID, CHOLECALCIFEROL, .ALPHA.-TOCOPHEROL ACETATE, DL-, THIAMINE MONONITRATE, RIBOFLAVIN, NIACINAMIDE, PYRIDOXINE HYDROCHLORIDE, FOLIC ACID, CYANOCOBALAMIN, CALCIUM CARBONATE, FERROUS FUMARATE, ZINC OXIDE, CUPRIC OXIDE 9.9; 120; 920; 200; 400; 2; 12; 27; 1; 20; 10; 3; 1.84; 3080; 25 MG/1; MG/1; [IU]/1; MG/1; [IU]/1; MG/1; UG/1; MG/1; MG/1; MG/1; MG/1; MG/1; MG/1; [IU]/1; MG/1
1 TABLET, FILM COATED ORAL DAILY
Qty: 90 TABLET | Refills: 4 | Status: SHIPPED | OUTPATIENT
Start: 2025-05-14

## 2025-05-14 NOTE — PROGRESS NOTES
Araceli Denis is a  who presents today for viability/dating ultrasound.  Patient's last menstrual period was 03/15/2025 (approximate).  She reports positive pregnancy test at the ER on 2025 with Bhcg of 8,527.  Pelvic ultrasound at that time noted SIUP with yolk sac but no fetal pole was detected at that time.  She denies vaginal bleeding or abdominal/pelvic pain.    BP 90/60 (BP Location: Left arm, Patient Position: Sitting)   Wt 66.6 kg (146 lb 12.8 oz)   LMP 03/15/2025 (Approximate)   BMI 25.20 kg/m²   Abdomen soft and non-tender.    Transvaginal Pelvic Ultrasound:  SIUP noted with CRL c/w 8w4d and + fetal cardiac activity of 170 bpm.  Final EDC is 2025 based on today's ultrasound.      Return in 1-2 weeks for OB intake.  Rx prenatal vitamins sent to pharmacy.    Current Outpatient Medications   Medication Instructions    Prenatal Vit-Fe Fumarate-FA (Prenatal Plus Vitamin/Mineral) 27-1 MG TABS 1 tablet, Oral, Daily         JAY Burger  [facility]  [facility] [address]  Dept: [phone]  Dept Fax: [phone]  Loc Appt: [phone]  Loc: [phone]  Loc Fax: [phone]

## 2025-05-14 NOTE — PROGRESS NOTES
Ultrasound Probe Disinfection    A transvaginal ultrasound was performed.   Prior to use, disinfection was performed with High Level Disinfection Process (Zimrideon)  Probe serial number [id number] was used.    Shirley Li MA  05/14/25  10:18 AM

## 2025-05-28 ENCOUNTER — PATIENT OUTREACH (OUTPATIENT)
Dept: OBGYN CLINIC | Facility: CLINIC | Age: 35
End: 2025-05-28

## 2025-05-28 ENCOUNTER — INITIAL PRENATAL (OUTPATIENT)
Dept: OBGYN CLINIC | Facility: CLINIC | Age: 35
End: 2025-05-28

## 2025-05-28 VITALS
BODY MASS INDEX: 26.26 KG/M2 | OXYGEN SATURATION: 100 % | SYSTOLIC BLOOD PRESSURE: 112 MMHG | HEIGHT: 63 IN | WEIGHT: 148.2 LBS | HEART RATE: 80 BPM | DIASTOLIC BLOOD PRESSURE: 70 MMHG

## 2025-05-28 DIAGNOSIS — Z3A.10 10 WEEKS GESTATION OF PREGNANCY: Primary | ICD-10-CM

## 2025-05-28 PROCEDURE — T1001 NURSING ASSESSMENT/EVALUATN: HCPCS

## 2025-05-28 NOTE — PATIENT INSTRUCTIONS
WARNING SIGNS DURING PREGNANCY  Call our office at [phone] for any of the followin. Vaginal bleeding  2. Sharp abdominal pain that does not go away  3. Fever (more than 100.4 and is not relieved by Tylenol)  4. Persistent vomiting lasting greater than 24 hours  5. Chest pain   6. Pain or burning when you urinate  7. Severe headache that doesn't resolve with Tylenol  8. Blurred vision or seeing spots in your vision  9. Sudden swelling of your face or hands  10. Redness, swelling or pain in a leg  11. A sudden weight gain in just a few days  12. Decrease in your baby's movement (after 28 weeks or the 6th month of pregnancy)  13. A loss of watery fluid from your vagina - can be a gush, a trickle or continuous wetness  14. After 20 weeks of pregnancy, rhythmic cramping (greater than 4 per hour) or menstrual like low/pelvic pain

## 2025-05-28 NOTE — LETTER
Work Letter    Araceli Denis  1990  [address] [facility] [address]    Dear Araceli Denis,      05/28/25        Your employee is a patient at the [facility].    We recommend that all pregnant women:    1. Have a well-ventilated workspace.  2. Wear low-heeled shoes.  3. Work no more than 40 hours per week.  4. Have a 15 minute break every 2 hours and at least 30 minutes for a meal break.   5. Use good body mechanics by bending at your knees to avoid back strain and lift no more than 20 pounds without assistance.  6. Have ready access to bathrooms and water.    She may continue to work until her due date unless medical complications arise. We anticipate she may return to work in 6-8 weeks after delivery.     Sincerely,    [facility]

## 2025-05-28 NOTE — PROGRESS NOTES
LINDA HYDE was referred by JAY Avelar to complete a PN SW assessment in the first trimester. Pt is , pt is [id number], her ROMERO is 2025. Pt is english speaking and she is here by herself fo aly RN intake.      Pt reports this pregnancy is unplanned but welcomed. Pt reports she was shocked when she found out, but is accepting of the pregnancy. FOB - shocked but accepting. Mom and sister are her biggest support systems, they live close by.     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 She lives with FOB and their children and her sister. No housing concerns or worries currently. Has MA, has SNAP, will apply for WIC, does not have On Track or CAP. She drives herself to appointments. No concerns with obtaining baby supplies.     No MH diagnosis, no hx of PPD. Current THC use - has a medical card - has had it for the last six months - for depression and anxiety, uses primarily when she has insomnia. 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     LINDA HYDE encouraged the patient to outreach as needed, will close this referral as no needs were identified at this time.

## 2025-05-28 NOTE — LETTER
Dentist Letter    Araceli Denis  1990  [address] [facility] [address]          05/28/25    We have had several requests from local dentist requesting permission to perform procedures on our patients who are pregnant. We wish to respond with this letter regarding some of the more routine procedures that we have been asked about.    The following procedures may be performed on our obstetric patients:   1. Administration of local anesthesia   2. Administration of antibiotics such as PCN, Ampicillin, and Erythromycin.   3. Administration of pain medications such as Tylenol, Tylenol with codeine, and if needed Percocet.   4. Shielded X-rays    Should you have any questions, please do not hesitate to contact at [phone].        Sincerely,    [facility]   [phone]

## 2025-05-28 NOTE — PROGRESS NOTES
"OBSTETRIC INTAKE VISIT    Araceli Denis presents today for initial OB visit and intake at 10w4d. LMP - 3/15/25. History obtained from patient and she reports it as follows:    Past Medical History[1]  Past Surgical History[2]  OB History    Para Term  AB Living   4 3 2 1 0 3   SAB IAB Ectopic Multiple Live Births   0 0 0 0 3      # Outcome Date GA Lbr Tj/2nd Weight Sex Type Anes PTL Lv   4 Current            3 Term 16 39w5d / 00:25 3724 g (8 lb 3.4 oz) M Vag-Spont OTHER N KHANH   2  08 36w0d  1984 g (4 lb 6 oz) F Vag-Spont  Y KHANH   1 Term 06 40w0d  3345 g (7 lb 6 oz) F Vag-Spont  N KHANH     Social History[3]    Current Outpatient Medications   Medication Instructions    Prenatal Vit-Fe Fumarate-FA (Prenatal Plus Vitamin/Mineral) 27-1 MG TABS 1 tablet, Oral, Daily       Allergies[4]    Vitals: /70 (BP Location: Right arm, Patient Position: Sitting, Cuff Size: Standard)   Pulse 80   Ht 5' 3\" (1.6 m)   Wt 67.2 kg (148 lb 3.2 oz)   LMP 03/15/2025 (Approximate)   SpO2 100%   BMI 26.25 kg/m²     Review of Systems:  Denies vaginal bleeding or leaking fluid.  Denies abdominal/pelvic pain or contractions.    Plan:  1. OB labwork ordered today to include Prenatal Panel, HCV antibody, Hgb fractionation cascade.  Other labs include Glucose 1H PG.  Advised patient to have drawn within the next few days.  2. Front staff will help pt schedule ultrasound with MFM.  3. Return 25 for H&P prenatal visit.  4. Referrals placed for WIC, , and [facility].  5. Given vaccines: None due.  6. Patient's depression screening was assessed with a PHQ-2 score of 0. Clinically patient does not have depression. No treatment is required.   in to see patient.  7. Reviewed the following educational topics with patient:   -routine prenatal visit/ultrasound/labwork schedule   -hospital for delivery and office phone/answering service contact " information   -nutritional demands of pregnancy, healthy dietary habits   -listeria, toxoplasmosis, seafood precautions   -weight gain expectations (based on pre-pregnant BMI)   -exercise, rest, and sexual activity during pregnancy   -abstinence from alcohol, tobacco, and illegal drugs   -common discomforts of pregnancy and appropriate management   -OTC medications safe to use in pregnancy   -genetic screening options   -vaccines in pregnancy   -symptoms to report to OB provider    -signs of PTL and pre-eclampsia    -vaginal bleeding/leaking of fluid    -severe n/v-unable to tolerate ANY food/fluids for more than 24 hours           [1]   Past Medical History:  Diagnosis Date    Abnormal Pap smear of cervix     2019 HSIL pap, colpo CIN3; LEEP 6/2020; 6/2021 NILM pap/neg HPV    Chlamydia 2016    Depression     no meds    Fibromyalgia     no meds   [2]   Past Surgical History:  Procedure Laterality Date    NC COLPOSCOPY CERVIX VAG LOOP ELTRD BX CERVIX N/A 6/12/2020    Procedure: BIOPSY LEEP CERVIX;  Surgeon: Jeremias Allison MD;  [facility];  Service: Gynecology    WISDOM TOOTH EXTRACTION     [3]   Social History  Tobacco Use    Smoking status: Former     Current packs/day: 0.25     Types: Cigarettes    Smokeless tobacco: Former    Tobacco comments:     Quit when found out was pregnant   Vaping Use    Vaping status: Never Used   Substance Use Topics    Alcohol use: Yes     Comment: occ    Drug use: Yes     Types: Marijuana     Comment: intermittent use   [4] No Known Allergies

## 2025-05-28 NOTE — LETTER
"    [facility] REFERRAL      Date: 5/28/2025    Patient name: Araceli Denis    YOB: 1990    Estimated Date of Delivery: 12/20/25      /70 (BP Location: Right arm, Patient Position: Sitting, Cuff Size: Standard)   Pulse 80   Ht 5' 3\" (1.6 m)   Wt 67.2 kg (148 lb 3.2 oz)   LMP 03/15/2025 (Approximate)   SpO2 100%   BMI 26.25 kg/m²         Thank you,  JAY Oconnell            [facility] locations:   1. [facility]       [address] [place] [address]       Phone: [phone]       Fax: [phone]     2. [facility]       [address] [address]       Phone: [phone]       Fax: [phone]       "

## 2025-05-28 NOTE — LETTER
5/28/2025      This letter is to confirm that Araceli Denis is pregnant and is under our care.  Her Estimated Date of Delivery: 12/20/25.    If you have any questions or concerns, please contact our office.  Thank you,    JAY Oconnell

## 2025-06-11 ENCOUNTER — APPOINTMENT (OUTPATIENT)
Dept: LAB | Facility: CLINIC | Age: 35
End: 2025-06-11
Payer: MEDICARE

## 2025-06-11 ENCOUNTER — INITIAL PRENATAL (OUTPATIENT)
Dept: OBGYN CLINIC | Facility: CLINIC | Age: 35
End: 2025-06-11

## 2025-06-11 VITALS
BODY MASS INDEX: 25.16 KG/M2 | SYSTOLIC BLOOD PRESSURE: 118 MMHG | WEIGHT: 147.4 LBS | HEIGHT: 64 IN | DIASTOLIC BLOOD PRESSURE: 80 MMHG

## 2025-06-11 DIAGNOSIS — Z34.81 PRENATAL CARE, SUBSEQUENT PREGNANCY IN FIRST TRIMESTER: Primary | ICD-10-CM

## 2025-06-11 DIAGNOSIS — B96.89 BV (BACTERIAL VAGINOSIS): ICD-10-CM

## 2025-06-11 DIAGNOSIS — Z3A.12 12 WEEKS GESTATION OF PREGNANCY: ICD-10-CM

## 2025-06-11 DIAGNOSIS — N76.0 BV (BACTERIAL VAGINOSIS): ICD-10-CM

## 2025-06-11 DIAGNOSIS — Z3A.10 10 WEEKS GESTATION OF PREGNANCY: ICD-10-CM

## 2025-06-11 DIAGNOSIS — Z11.3 SCREEN FOR STD (SEXUALLY TRANSMITTED DISEASE): ICD-10-CM

## 2025-06-11 PROBLEM — O99.330 TOBACCO USE DURING PREGNANCY: Status: ACTIVE | Noted: 2025-06-11

## 2025-06-11 PROBLEM — O09.899 HISTORY OF PRETERM DELIVERY, CURRENTLY PREGNANT: Status: ACTIVE | Noted: 2025-06-11

## 2025-06-11 PROBLEM — O09.529 AMA (ADVANCED MATERNAL AGE) MULTIGRAVIDA 35+: Status: ACTIVE | Noted: 2025-06-11

## 2025-06-11 PROBLEM — Z83.3 FAMILY HISTORY OF DIABETES MELLITUS IN FIRST DEGREE RELATIVE: Status: ACTIVE | Noted: 2025-06-11

## 2025-06-11 PROBLEM — O34.40 HISTORY OF CERVICAL LEEP BIOPSY AFFECTING CARE OF MOTHER, ANTEPARTUM: Status: ACTIVE | Noted: 2025-06-11

## 2025-06-11 PROBLEM — Z98.890 HISTORY OF CERVICAL LEEP BIOPSY AFFECTING CARE OF MOTHER, ANTEPARTUM: Status: ACTIVE | Noted: 2025-06-11

## 2025-06-11 LAB
ABO GROUP BLD: NORMAL
BACTERIA UR QL AUTO: ABNORMAL /HPF
BASOPHILS # BLD AUTO: 0.04 THOUSANDS/ÂΜL (ref 0–0.1)
BASOPHILS NFR BLD AUTO: 0 % (ref 0–1)
BILIRUB UR QL STRIP: NEGATIVE
BLD GP AB SCN SERPL QL: NEGATIVE
CLARITY UR: ABNORMAL
COLOR UR: YELLOW
EOSINOPHIL # BLD AUTO: 0.07 THOUSAND/ÂΜL (ref 0–0.61)
EOSINOPHIL NFR BLD AUTO: 1 % (ref 0–6)
ERYTHROCYTE [DISTWIDTH] IN BLOOD BY AUTOMATED COUNT: 13.4 % (ref 11.6–15.1)
GLUCOSE UR STRIP-MCNC: NEGATIVE MG/DL
HCT VFR BLD AUTO: 33.9 % (ref 34.8–46.1)
HGB BLD-MCNC: 11.2 G/DL (ref 11.5–15.4)
HGB UR QL STRIP.AUTO: NEGATIVE
IMM GRANULOCYTES # BLD AUTO: 0.06 THOUSAND/UL (ref 0–0.2)
IMM GRANULOCYTES NFR BLD AUTO: 1 % (ref 0–2)
KETONES UR STRIP-MCNC: NEGATIVE MG/DL
LEUKOCYTE ESTERASE UR QL STRIP: ABNORMAL
LYMPHOCYTES # BLD AUTO: 2.63 THOUSANDS/ÂΜL (ref 0.6–4.47)
LYMPHOCYTES NFR BLD AUTO: 27 % (ref 14–44)
MCH RBC QN AUTO: 32.6 PG (ref 26.8–34.3)
MCHC RBC AUTO-ENTMCNC: 33 G/DL (ref 31.4–37.4)
MCV RBC AUTO: 99 FL (ref 82–98)
MONOCYTES # BLD AUTO: 0.58 THOUSAND/ÂΜL (ref 0.17–1.22)
MONOCYTES NFR BLD AUTO: 6 % (ref 4–12)
MUCOUS THREADS UR QL AUTO: ABNORMAL
NEUTROPHILS # BLD AUTO: 6.53 THOUSANDS/ÂΜL (ref 1.85–7.62)
NEUTS SEG NFR BLD AUTO: 65 % (ref 43–75)
NITRITE UR QL STRIP: POSITIVE
NON-SQ EPI CELLS URNS QL MICRO: ABNORMAL /HPF
NRBC BLD AUTO-RTO: 0 /100 WBCS
PH UR STRIP.AUTO: 6.5 [PH]
PLATELET # BLD AUTO: 406 THOUSANDS/UL (ref 149–390)
PMV BLD AUTO: 9.2 FL (ref 8.9–12.7)
PROT UR STRIP-MCNC: ABNORMAL MG/DL
RBC # BLD AUTO: 3.44 MILLION/UL (ref 3.81–5.12)
RBC #/AREA URNS AUTO: ABNORMAL /HPF
RH BLD: POSITIVE
RUBV IGG SERPL IA-ACNC: 114.2 IU/ML
SP GR UR STRIP.AUTO: 1.02 (ref 1–1.03)
SPECIMEN EXPIRATION DATE: NORMAL
UROBILINOGEN UR STRIP-ACNC: <2 MG/DL
WBC # BLD AUTO: 9.91 THOUSAND/UL (ref 4.31–10.16)
WBC #/AREA URNS AUTO: ABNORMAL /HPF

## 2025-06-11 PROCEDURE — 83020 HEMOGLOBIN ELECTROPHORESIS: CPT

## 2025-06-11 PROCEDURE — 86900 BLOOD TYPING SEROLOGIC ABO: CPT

## 2025-06-11 PROCEDURE — 87389 HIV-1 AG W/HIV-1&-2 AB AG IA: CPT

## 2025-06-11 PROCEDURE — 86706 HEP B SURFACE ANTIBODY: CPT

## 2025-06-11 PROCEDURE — 87077 CULTURE AEROBIC IDENTIFY: CPT

## 2025-06-11 PROCEDURE — 81001 URINALYSIS AUTO W/SCOPE: CPT

## 2025-06-11 PROCEDURE — 99213 OFFICE O/P EST LOW 20 MIN: CPT | Performed by: NURSE PRACTITIONER

## 2025-06-11 PROCEDURE — 85025 COMPLETE CBC W/AUTO DIFF WBC: CPT

## 2025-06-11 PROCEDURE — 86850 RBC ANTIBODY SCREEN: CPT

## 2025-06-11 PROCEDURE — 86762 RUBELLA ANTIBODY: CPT

## 2025-06-11 PROCEDURE — 86901 BLOOD TYPING SEROLOGIC RH(D): CPT

## 2025-06-11 PROCEDURE — 87591 N.GONORRHOEAE DNA AMP PROB: CPT | Performed by: NURSE PRACTITIONER

## 2025-06-11 PROCEDURE — 87186 SC STD MICRODIL/AGAR DIL: CPT

## 2025-06-11 PROCEDURE — 86803 HEPATITIS C AB TEST: CPT

## 2025-06-11 PROCEDURE — 86780 TREPONEMA PALLIDUM: CPT

## 2025-06-11 PROCEDURE — 87086 URINE CULTURE/COLONY COUNT: CPT

## 2025-06-11 PROCEDURE — 36415 COLL VENOUS BLD VENIPUNCTURE: CPT

## 2025-06-11 PROCEDURE — 87491 CHLMYD TRACH DNA AMP PROBE: CPT | Performed by: NURSE PRACTITIONER

## 2025-06-11 RX ORDER — METRONIDAZOLE 500 MG/1
500 TABLET ORAL 2 TIMES DAILY
Qty: 14 TABLET | Refills: 0 | Status: SHIPPED | OUTPATIENT
Start: 2025-06-11 | End: 2025-06-18

## 2025-06-11 NOTE — PROGRESS NOTES
Araceli Denis presents today for OB H&P visit at 12w4d.  TT=265/80  Wt=66.9 kg (147 lb 6.4 oz); Body mass index is 25.3 kg/m².; TWG=7.893 kg (17 lb 6.4 oz)  Fetal Heart Rate: 151  Abdomen: soft, non-tender.  She reports vaginal discharge.  On wet mount, BV is noted.  Given Rx Flagyl.  Denies vaginal bleeding or leaking of fluid.  Pap smear up to date (4/22/25 NILM with negative HPV).  GC/CT cultures collected.  Scheduled for ultrasound 6/17/25.  Reviewed common discomforts of pregnancy in first trimester and warning signs.  Advised to continue medications and return in 4 weeks.      Current Outpatient Medications   Medication Instructions    Prenatal Vit-Fe Fumarate-FA (Prenatal Plus Vitamin/Mineral) 27-1 MG TABS 1 tablet, Oral, Daily       Laboratory workup: initial OB labs (done 6/11/25)    Genetic Screening: scheduled w/MFM on 6/17/25    Vaccinations: influenza (will offer during flu season); Tdap (will offer after 27 weeks); RSV (will offer b/w [id number] during RSV season); COVID-19 (given 10/3/22 & 8/4/23)    Postpartum contraception: desires Depoprovera    Fetal Ultrasounds:  5/14/25 (8w4d) EDC confirmed      G4 Problems (from 05/14/25 to present)       Problem Noted Diagnosed Resolved    AMA 6/11/2025 by JAY Burger  No    Overview Signed 6/11/2025  2:18 PM by JAY Burger   Needs genetic counseling - scheduled for 6/17/25         Hx PTD 6/11/2025 by JAY Burger  No    Overview Signed 6/11/2025  2:20 PM by JAY Burger   2008 spontaneous PTD @36 weeks  2016 full term uncomplicated pregnancy  Needs MFM consultation - scheduled for 6/17/25         Hx LEEP 6/11/2025 by JAY Burger  No    Overview Signed 6/11/2025  2:21 PM by JAY Burger   Performed 2020         Fam hx DM 6/11/2025 by JAY Burger  No    Overview Signed 6/11/2025  2:27 PM by JAY Burger   Pt's sister w/DM  Needs early GDM screen          Tobacco use during pregnancy 2025 by JAY Burger  No    Overview Signed 2025  2:31 PM by JAY Burger   Cessation counseling - done  Smoked 7-8 cigs/day prior to pregnancy  Quit 2025 with knowledge of pregnancy               Past Medical History:   Diagnosis Date    Abnormal Pap smear of cervix      HSIL pap, colpo CIN3; LEEP 2020; 2021 NILM pap/neg HPV    Chlamydia 2016    Depression     Fibromyalgia      Past Surgical History:   Procedure Laterality Date    KS COLPOSCOPY CERVIX VAG LOOP ELTRD BX CERVIX N/A 2020    Procedure: BIOPSY LEEP CERVIX;  Surgeon: Jeremias Allison MD;  [facility];  Service: Gynecology    WISDOM TOOTH EXTRACTION       Family History   Problem Relation Name Age of Onset    Diabetes Mother      Asthma Mother      Diabetes Sister      Asthma Sister      Cancer Maternal Uncle          lung    Breast cancer Neg Hx      Colon cancer Neg Hx      Ovarian cancer Neg Hx       OB History          4    Para   3    Term   2       1    AB   0    Living   3         SAB   0    IAB   0    Ectopic   0    Multiple   0    Live Births   3               Social History     Tobacco Use    Smoking status: Former     Types: Cigarettes    Smokeless tobacco: Former    Tobacco comments:     Quit 23   Vaping Use    Vaping status: Never Used   Substance Use Topics    Alcohol use: Not Currently    Drug use: Not Currently     Types: Marijuana

## 2025-06-11 NOTE — PATIENT INSTRUCTIONS
Thank you for your confidence in our team.   We appreciate you and welcome your feedback.   If you receive a survey from us, please take a few moments to let us know how we are doing.   Sincerely,  JAY Burger       WARNING SIGNS DURING PREGNANCY  Call our office at [phone] for any of the followin. Vaginal bleeding  2. Sharp abdominal pain that does not go away  3. Fever (more than 100.4 and is not relieved by Tylenol)  4. Persistent vomiting lasting greater than 24 hours  5. Chest pain   6. Pain or burning when you urinate  7. Severe headache that doesn't resolve with Tylenol  8. Blurred vision or seeing spots in your vision  9. Sudden swelling of your face or hands  10. Redness, swelling or pain in a leg  11. A sudden weight gain in just a few days  12. Decrease in your baby's movement (after 28 weeks or the 6th month of pregnancy)  13. A loss of watery fluid from your vagina - can be a gush, a trickle or continuous wetness  14. After 20 weeks of pregnancy, rhythmic cramping (greater than 4 per hour) or menstrual like low/pelvic pain

## 2025-06-12 LAB
C TRACH DNA SPEC QL NAA+PROBE: NEGATIVE
HBV SURFACE AB SER-ACNC: 47.4 MIU/ML
HBV SURFACE AG SER QL: NORMAL
HCV AB SER QL: NORMAL
HIV 1+2 AB+HIV1 P24 AG SERPL QL IA: NORMAL
N GONORRHOEA DNA SPEC QL NAA+PROBE: NEGATIVE
TREPONEMA PALLIDUM IGG+IGM AB [PRESENCE] IN SERUM OR PLASMA BY IMMUNOASSAY: NORMAL

## 2025-06-13 ENCOUNTER — TELEPHONE (OUTPATIENT)
Dept: OBGYN CLINIC | Facility: CLINIC | Age: 35
End: 2025-06-13

## 2025-06-13 DIAGNOSIS — N30.00 ACUTE CYSTITIS WITHOUT HEMATURIA: Primary | ICD-10-CM

## 2025-06-13 PROBLEM — O23.40 UTI (URINARY TRACT INFECTION) DURING PREGNANCY: Status: ACTIVE | Noted: 2025-06-13

## 2025-06-13 LAB
BACTERIA UR CULT: ABNORMAL
HGB A MFR BLD: 2.3 % (ref 1.8–3.2)
HGB A MFR BLD: 97.7 % (ref 96.4–98.8)
HGB F MFR BLD: 0 % (ref 0–2)
HGB FRACT BLD-IMP: NORMAL
HGB S MFR BLD: 0 %

## 2025-06-13 RX ORDER — NITROFURANTOIN 25; 75 MG/1; MG/1
100 CAPSULE ORAL 2 TIMES DAILY
Qty: 10 CAPSULE | Refills: 0 | Status: SHIPPED | OUTPATIENT
Start: 2025-06-13 | End: 2025-06-18

## 2025-06-13 NOTE — TELEPHONE ENCOUNTER
I contacted patient by phone and advised her that urine culture notes E. Coli UTI.  Rx Macrobid sent to pharmacy.  She advises me that when she was treated for UTI on 4/2025 she only took Keflex once daily x7 days.  Advised her now to take Macrobid twice daily x5 days.

## 2025-06-16 PROBLEM — O09.891 HISTORY OF PRETERM DELIVERY, CURRENTLY PREGNANT, FIRST TRIMESTER: Status: ACTIVE | Noted: 2025-06-16

## 2025-06-16 PROBLEM — O09.521 ELDERLY MULTIGRAVIDA, FIRST TRIMESTER: Status: ACTIVE | Noted: 2025-06-16

## 2025-06-16 PROBLEM — O99.331 TOBACCO SMOKING AFFECTING PREGNANCY IN FIRST TRIMESTER: Status: ACTIVE | Noted: 2025-06-16

## 2025-06-16 NOTE — ASSESSMENT & PLAN NOTE
Orders:    aspirin 81 mg chewable tablet; Chew 2 tablets (162 mg total) daily at bedtime  Maternal age and prior adverse pregnancy outcome (spontaneous  birth and fetal growth restriction)associated with increased risk for preeclampsia Daily low-dose aspirin preeclampsia prophylaxis recommended  Daily low-dose aspirin preeclampsia prophylaxis recommended (162 m until 36 weeks gestationg/day)  Detailed MFM fetal anatomy evaluation recommended at 20 weeks gestation    Today's ultrasound findings and suggested follow-up were discussed in detail with Araceli Brar.  At age 35 at her estimated due date, her risk for a liveborn baby at term with Down syndrome is 1 in 231, with a risk for all chromosomal abnormalities of 1 in 123.  We discussed that definitive prenatal diagnosis is possible only with genetic amniocentesis or chorionic villus sampling and discussed the small procedure related risk for pregnancy loss in each case.  We discussed the option of genetic screening using cell free DNA analysis which is not diagnostic, but which has sensitivities for identification of Down syndrome, trisomy 13, trisomy 18, and sex chromosome aneuploidies of 99.9%.  Araceli Brar opted for genetic screening using cell free DNA analysis.  MSAFP screening is recommended between 16 and 20 weeks gestation.  She will return for detailed MFM fetal anatomy evaluation at about 20 weeks gestation.    Her age, race, and prior adverse pregnancy outcome (spontaneous  birth) increase the risk for preeclampsia.  I recommended, and ordered, prophylaxis with 162 mg of aspirin a day which will significantly reduce her risk.  Aspirin prophylaxis is recommended until 36 weeks gestation.    Early GDM screening is recommended secondary to advanced maternal age and strong family history of diabetes.  Repeat screening is recommended at 24 to 28 weeks gestation of additionally negative.    Serial growth ultrasounds studies are recommended during  the second half of the pregnancy for the indication of prior fetal growth restriction.

## 2025-06-16 NOTE — ASSESSMENT & PLAN NOTE
Increased risks for adverse pregnancy outcomes including spontaneous  birth, abruption, fetal growth restriction, stillbirth, SIDS  the indications of advanced maternal age  Araceli Brar states that she has recently stopped smoking cigarettes

## 2025-06-16 NOTE — PROGRESS NOTES
CONSULTATION: MATERNAL-FETAL MEDICINE  Name: Araceli Denis      : 1990      MRN: [id number]  Encounter Provider:   [facility]  Encounter Date: 2025   Encounter department: [facility]  :  Assessment & Plan  13 weeks gestation of pregnancy    Orders:    [id number] PLUS Core+SCA  Normal late first trimester nuchal translucency and detailed fetal anatomy evaluation today  Elderly multigravida, first trimester    Orders:    aspirin 81 mg chewable tablet; Chew 2 tablets (162 mg total) daily at bedtime  Maternal age and prior adverse pregnancy outcome (spontaneous  birth and fetal growth restriction)associated with increased risk for preeclampsia Daily low-dose aspirin preeclampsia prophylaxis recommended  Daily low-dose aspirin preeclampsia prophylaxis recommended (162 m until 36 weeks gestationg/day)  Detailed MFM fetal anatomy evaluation recommended at 20 weeks gestation    Today's ultrasound findings and suggested follow-up were discussed in detail with Araceli Brar.  At age 35 at her estimated due date, her risk for a liveborn baby at term with Down syndrome is 1 in 231, with a risk for all chromosomal abnormalities of 1 in 123.  We discussed that definitive prenatal diagnosis is possible only with genetic amniocentesis or chorionic villus sampling and discussed the small procedure related risk for pregnancy loss in each case.  We discussed the option of genetic screening using cell free DNA analysis which is not diagnostic, but which has sensitivities for identification of Down syndrome, trisomy 13, trisomy 18, and sex chromosome aneuploidies of 99.9%.  Araceli Brar opted for genetic screening using cell free DNA analysis.  MSAFP screening is recommended between 16 and 20 weeks gestation.  She will return for detailed MFM fetal anatomy evaluation at about 20 weeks gestation.    Her age, race, and prior adverse pregnancy outcome (spontaneous   birth) increase the risk for preeclampsia.  I recommended, and ordered, prophylaxis with 162 mg of aspirin a day which will significantly reduce her risk.  Aspirin prophylaxis is recommended until 36 weeks gestation.    Early GDM screening is recommended secondary to advanced maternal age and strong family history of diabetes.  Repeat screening is recommended at 24 to 28 weeks gestation of additionally negative.    Serial growth ultrasounds studies are recommended during the second half of the pregnancy for the indication of prior fetal growth restriction.      Tobacco smoking affecting pregnancy in first trimester       Increased risks for adverse pregnancy outcomes including spontaneous  birth, abruption, fetal growth restriction, stillbirth, SIDS  the indications of advanced maternal age  Araceli Brar states that she has recently stopped smoking cigarettes  History of  delivery, currently pregnant, first trimester       Prior 36 weeks spontaneous  birth  We discussed that between one third and one half of women with a prior spontaneous  birth will have recurrence in a subsequent pregnancy.  Serial second trimester cervical sonography by transvaginal transvaginal sonography between 16 and 24 weeks gestation   Cervical cerclage recommended with cervical shortening to 25 mm or less prior to 23 completed weeks gestation  Vaginal progesterone recommended with evidence of cervical shortening      History of Present Illness   HPI  Araceli Brar is a 34 y.o.  Black female at 13w2d presenting for consultation for for the indications of advanced maternal age and prior spontaneous  birth.  She has no complaints today, denying nausea and vomiting and vaginal bleeding.  She has not yet been screened for gestational diabetes during this pregnancy.  Araceli Brar will be 35 years old at her estimated due date.  Recent hemoglobin fractionation cascade revealed normal adult hemoglobin.  A prenatal  office note of  was reviewed prior to the [facility] encounter.    Araceli Brar has a history of 2 prior spontaneous vaginal deliveries at term in  and  following uncomplicated prenatal courses.  She delivered babies with birth weight of 7 pounds 6 ounce and 8 pounds 3 ounce.  In , she had a spontaneous  birth at 36 weeks gestation, delivering a growth restricted 4 pound 6 ounce baby who had a 3-week NICU stay.  Each of her children is currently healthy.  None of her pregnancies was complicated by preeclampsia or gestational diabetes.  Her past medical history is unremarkable.  She has a history of a LEEP procedure.  Her only daily medication is a prenatal vitamin.  Araceli Brar denies tobacco, alcohol, marijuana, and illicit drug use during the pregnancy.  Her mom and sister each has diabetes.  Her mom has hypertension.  There is no family history of sickle cell anemia.  The family medical history is otherwise negative with respect of first-degree relatives with diabetes, hypertension, venous thromboembolism, or preeclampsia.  The family genetic history is negative with respect to genetic abnormalities, birth defects, and mental retardation.      Her Antepartum course is significant for:  Problem List[1]    Review of Systems  Historical Information   Medications Ordered Prior to Encounter[2]      OB History    Para Term  AB Living   4 3 2 1 0 3   SAB IAB Ectopic Multiple Live Births   0 0 0 0 3      # Outcome Date GA Lbr Tj/2nd Weight Sex Type Anes PTL Lv   4 Current            3 Term 16 39w5d / 00:25 3724 g (8 lb 3.4 oz) M Vag-Spont OTHER N KHANH   2  08 36w0d  1984 g (4 lb 6 oz) F Vag-Spont  Y KHANH   1 Term 06 40w0d  3345 g (7 lb 6 oz) F Vag-Spont  N KHANH     Past Medical History[3]  Past Surgical History[4]          Current Medications[5]  No Known Allergies    Objective   LMP 03/15/2025 (Approximate)      Physical Exam  No exam performed today,   General  "appearance: alert, well appearing, and in no distress and oriented to person, place, and time.  The remainder of her physical examination was deferred as she was here today for consultation and discussion.    Please refer to \"Imaging\" for detailed ultrasound report from today's visit.                [1]   Patient Active Problem List  Diagnosis    12 weeks gestation of pregnancy    AMA    Hx PTD    Hx LEEP    Fam hx DM    Tobacco use during pregnancy    UTI during pregnancy    Elderly multigravida, first trimester    Tobacco smoking affecting pregnancy in first trimester    History of  delivery, currently pregnant, first trimester   [2]   Current Outpatient Medications on File Prior to Visit   Medication Sig Dispense Refill    metroNIDAZOLE (FLAGYL) 500 mg tablet Take 1 tablet (500 mg total) by mouth in the morning and 1 tablet (500 mg total) before bedtime. Do all this for 7 days. 14 tablet 0    nitrofurantoin (MACROBID) 100 mg capsule Take 1 capsule (100 mg total) by mouth 2 (two) times a day for 5 days 10 capsule 0    Prenatal Vit-Fe Fumarate-FA (Prenatal Plus Vitamin/Mineral) 27-1 MG TABS Take 1 tablet by mouth in the morning 90 tablet 4     No current facility-administered medications on file prior to visit.   [3]   Past Medical History:  Diagnosis Date    Abnormal Pap smear of cervix     2019 HSIL pap, colpo CIN3; LEEP 2020; 2021 NILM pap/neg HPV    Chlamydia 2016    Depression     Fibromyalgia    [4]   Past Surgical History:  Procedure Laterality Date    MS COLPOSCOPY CERVIX VAG LOOP ELTRD BX CERVIX N/A 2020    Procedure: BIOPSY LEEP CERVIX;  Surgeon: Jeremias Allison MD;  [facility];  Service: Gynecology    WISDOM TOOTH EXTRACTION     [5]   Current Outpatient Medications:     nitrofurantoin (MACROBID) 100 mg capsule, Take 1 capsule (100 mg total) by mouth 2 (two) times a day for 5 days, Disp: 10 capsule, Rfl: 0    metroNIDAZOLE (FLAGYL) 500 mg tablet, Take 1 tablet (500 mg total) by " mouth in the morning and 1 tablet (500 mg total) before bedtime. Do all this for 7 days., Disp: 14 tablet, Rfl: 0    Prenatal Vit-Fe Fumarate-FA (Prenatal Plus Vitamin/Mineral) 27-1 MG TABS, Take 1 tablet by mouth in the morning, Disp: 90 tablet, Rfl: 4

## 2025-06-16 NOTE — ASSESSMENT & PLAN NOTE
Prior 36 weeks spontaneous  birth  We discussed that between one third and one half of women with a prior spontaneous  birth will have recurrence in a subsequent pregnancy.  Serial second trimester cervical sonography by transvaginal transvaginal sonography between 16 and 24 weeks gestation   Cervical cerclage recommended with cervical shortening to 25 mm or less prior to 23 completed weeks gestation  Vaginal progesterone recommended with evidence of cervical shortening

## 2025-06-17 ENCOUNTER — ROUTINE PRENATAL (OUTPATIENT)
Age: 35
End: 2025-06-17
Attending: NURSE PRACTITIONER
Payer: MEDICARE

## 2025-06-17 ENCOUNTER — ANCILLARY PROCEDURE (OUTPATIENT)
Age: 35
End: 2025-06-17
Attending: NURSE PRACTITIONER
Payer: MEDICARE

## 2025-06-17 VITALS
DIASTOLIC BLOOD PRESSURE: 76 MMHG | HEART RATE: 96 BPM | WEIGHT: 148.6 LBS | HEIGHT: 63 IN | SYSTOLIC BLOOD PRESSURE: 108 MMHG | BODY MASS INDEX: 26.33 KG/M2

## 2025-06-17 DIAGNOSIS — Z3A.13 13 WEEKS GESTATION OF PREGNANCY: ICD-10-CM

## 2025-06-17 DIAGNOSIS — O99.331 TOBACCO SMOKING AFFECTING PREGNANCY IN FIRST TRIMESTER: ICD-10-CM

## 2025-06-17 DIAGNOSIS — O09.891 HISTORY OF PRETERM DELIVERY, CURRENTLY PREGNANT, FIRST TRIMESTER: ICD-10-CM

## 2025-06-17 DIAGNOSIS — O09.521 SUPERVISION OF ELDERLY MULTIGRAVIDA IN FIRST TRIMESTER: ICD-10-CM

## 2025-06-17 DIAGNOSIS — O09.291 HISTORY OF INTRAUTERINE GROWTH RESTRICTION IN PRIOR PREGNANCY, CURRENTLY PREGNANT IN FIRST TRIMESTER: ICD-10-CM

## 2025-06-17 DIAGNOSIS — Z3A.10 10 WEEKS GESTATION OF PREGNANCY: ICD-10-CM

## 2025-06-17 DIAGNOSIS — O09.521 ELDERLY MULTIGRAVIDA, FIRST TRIMESTER: Primary | ICD-10-CM

## 2025-06-17 DIAGNOSIS — Z36.0 ENCOUNTER FOR ANTENATAL SCREENING FOR CHROMOSOMAL ANOMALIES: ICD-10-CM

## 2025-06-17 PROCEDURE — 76801 OB US < 14 WKS SINGLE FETUS: CPT | Performed by: OBSTETRICS & GYNECOLOGY

## 2025-06-17 PROCEDURE — NC001 PR NO CHARGE: Performed by: OBSTETRICS & GYNECOLOGY

## 2025-06-17 PROCEDURE — 36415 COLL VENOUS BLD VENIPUNCTURE: CPT | Performed by: OBSTETRICS & GYNECOLOGY

## 2025-06-17 PROCEDURE — 76813 OB US NUCHAL MEAS 1 GEST: CPT | Performed by: OBSTETRICS & GYNECOLOGY

## 2025-06-17 PROCEDURE — 99244 OFF/OP CNSLTJ NEW/EST MOD 40: CPT | Performed by: OBSTETRICS & GYNECOLOGY

## 2025-06-17 RX ORDER — ASPIRIN 81 MG/1
162 TABLET, CHEWABLE ORAL
Qty: 60 TABLET | Refills: 1 | Status: SHIPPED | OUTPATIENT
Start: 2025-06-17 | End: 2025-08-16

## 2025-06-17 NOTE — PROGRESS NOTES
Patient chose to have [facility] [id number] Non-Invasive Prenatal Screen [id number] PLUS w/ SCA, WITH fetal sex (per pt request).  Patient choose to be billed through insurance.     Patient given brochure and is aware [facility] will contact patient's insurance and coordinate coverage.  Provided [facility] contact information. General inquiries [phone], Cost estimates [phone] and [facility] Billing [phone]. Website [email or web address]     Blood collection tubes labeled with patient identifiers (name, medical record number, and date of birth).     Filled out [facility] order form. Patient chose to have blood drawn in our office at time of visit. NIPS was drawn from right arm with a butterfly needle by IRINA Nuñez MA.       If patient chose to have blood work drawn at a [facility] lab we requested patient notify MFM (via phone call or Gennio message) when blood collected so office can follow up on results.       Maternal Fetal Medicine will have results in approximately 5-7 business days and will call patient or notify via Gennio.  Patient aware viewing lab result online will reveal fetal sex if ordered.    Patient verbalized understanding of all instructions and no questions at this time.

## 2025-06-17 NOTE — LETTER
2025     JAY Burger  [address] [address]    Patient: Araceli Denis   YOB: 1990   Date of Visit: 2025       Dear JAY Cox:    Thank you for referring Araceli Denis to me for evaluation. Below are my notes for this consultation.    If you have questions, please do not hesitate to call me. I look forward to following your patient along with you.         Sincerely,        Avinash Francisco MD        CC: No Recipients    Avinash Francisco MD  2025 12:58 PM  Sign when Signing Visit  CONSULTATION: MATERNAL-FETAL MEDICINE  Name: Araceli Denis      : 1990      MRN: [id number]  Encounter Provider:  [address]  Encounter Date: 2025   Encounter department: [facility] [address]  :  Assessment & Plan  13 weeks gestation of pregnancy    Orders:  •  [id number] PLUS Core+SCA  Normal late first trimester nuchal translucency and detailed fetal anatomy evaluation today  Elderly multigravida, first trimester    Orders:  •  aspirin 81 mg chewable tablet; Chew 2 tablets (162 mg total) daily at bedtime  Maternal age and prior adverse pregnancy outcome (spontaneous  birth and fetal growth restriction)associated with increased risk for preeclampsia Daily low-dose aspirin preeclampsia prophylaxis recommended  Daily low-dose aspirin preeclampsia prophylaxis recommended (162 m until 36 weeks gestationg/day)  Detailed MFM fetal anatomy evaluation recommended at 20 weeks gestation    Today's ultrasound findings and suggested follow-up were discussed in detail with Araceli Brar.  At age 35 at her estimated due date, her risk for a liveborn baby at term with Down syndrome is 1 in 231, with a risk for all chromosomal abnormalities of 1 in 123.  We discussed that definitive prenatal diagnosis is possible only with genetic amniocentesis or chorionic villus sampling and discussed the  small procedure related risk for pregnancy loss in each case.  We discussed the option of genetic screening using cell free DNA analysis which is not diagnostic, but which has sensitivities for identification of Down syndrome, trisomy 13, trisomy 18, and sex chromosome aneuploidies of 99.9%.  Araceli Brar opted for genetic screening using cell free DNA analysis.  MSAFP screening is recommended between 16 and 20 weeks gestation.  She will return for detailed MFM fetal anatomy evaluation at about 20 weeks gestation.    Her age, race, and prior adverse pregnancy outcome (spontaneous  birth) increase the risk for preeclampsia.  I recommended, and ordered, prophylaxis with 162 mg of aspirin a day which will significantly reduce her risk.  Aspirin prophylaxis is recommended until 36 weeks gestation.    Early GDM screening is recommended secondary to advanced maternal age and strong family history of diabetes.  Repeat screening is recommended at 24 to 28 weeks gestation of additionally negative.    Serial growth ultrasounds studies are recommended during the second half of the pregnancy for the indication of prior fetal growth restriction.      Tobacco smoking affecting pregnancy in first trimester       Increased risks for adverse pregnancy outcomes including spontaneous  birth, abruption, fetal growth restriction, stillbirth, SIDS  the indications of advanced maternal age  Araceli Brar states that she has recently stopped smoking cigarettes  History of  delivery, currently pregnant, first trimester       Prior 36 weeks spontaneous  birth  We discussed that between one third and one half of women with a prior spontaneous  birth will have recurrence in a subsequent pregnancy.  Serial second trimester cervical sonography by transvaginal transvaginal sonography between 16 and 24 weeks gestation   Cervical cerclage recommended with cervical shortening to 25 mm or less prior to 23 completed weeks  gestation  Vaginal progesterone recommended with evidence of cervical shortening      History of Present Illness  HPI  Araceli Brar is a 34 y.o.  Black female at 13w2d presenting for consultation for for the indications of advanced maternal age and prior spontaneous  birth.  She has no complaints today, denying nausea and vomiting and vaginal bleeding.  She has not yet been screened for gestational diabetes during this pregnancy.  Araceli Brar will be 35 years old at her estimated due date.  Recent hemoglobin fractionation cascade revealed normal adult hemoglobin.  A prenatal office note of  was reviewed prior to the [facility] encounter.    Araceli Brar has a history of 2 prior spontaneous vaginal deliveries at term in  and  following uncomplicated prenatal courses.  She delivered babies with birth weight of 7 pounds 6 ounce and 8 pounds 3 ounce.  In , she had a spontaneous  birth at 36 weeks gestation, delivering a growth restricted 4 pound 6 ounce baby who had a 3-week NICU stay.  Each of her children is currently healthy.  None of her pregnancies was complicated by preeclampsia or gestational diabetes.  Her past medical history is unremarkable.  She has a history of a LEEP procedure.  Her only daily medication is a prenatal vitamin.  Araceli Brar denies tobacco, alcohol, marijuana, and illicit drug use during the pregnancy.  Her mom and sister each has diabetes.  Her mom has hypertension.  There is no family history of sickle cell anemia.  The family medical history is otherwise negative with respect of first-degree relatives with diabetes, hypertension, venous thromboembolism, or preeclampsia.  The family genetic history is negative with respect to genetic abnormalities, birth defects, and mental retardation.      Her Antepartum course is significant for:  Problem List[1]    Review of Systems  Historical Information  Medications Ordered Prior to Encounter[2]      OB History    Para  "Term  AB Living   4 3 2 1 0 3   SAB IAB Ectopic Multiple Live Births   0 0 0 0 3      # Outcome Date GA Lbr Tj/2nd Weight Sex Type Anes PTL Lv   4 Current            3 Term 16 39w5d / 00:25 3724 g (8 lb 3.4 oz) M Vag-Spont OTHER N KHANH   2  08 36w0d  1984 g (4 lb 6 oz) F Vag-Spont  Y KHANH   1 Term 06 40w0d  3345 g (7 lb 6 oz) F Vag-Spont  N KHANH     Past Medical History[3]  Past Surgical History[4]          Current Medications[5]  No Known Allergies    Objective  LMP 03/15/2025 (Approximate)      Physical Exam  No exam performed today,   General appearance: alert, well appearing, and in no distress and oriented to person, place, and time.  The remainder of her physical examination was deferred as she was here today for consultation and discussion.    Please refer to \"Imaging\" for detailed ultrasound report from today's visit.                [1]   Patient Active Problem List  Diagnosis   • 12 weeks gestation of pregnancy   • AMA   • Hx PTD   • Hx LEEP   • Fam hx DM   • Tobacco use during pregnancy   • UTI during pregnancy   • Elderly multigravida, first trimester   • Tobacco smoking affecting pregnancy in first trimester   • History of  delivery, currently pregnant, first trimester   [2]   Current Outpatient Medications on File Prior to Visit   Medication Sig Dispense Refill   • metroNIDAZOLE (FLAGYL) 500 mg tablet Take 1 tablet (500 mg total) by mouth in the morning and 1 tablet (500 mg total) before bedtime. Do all this for 7 days. 14 tablet 0   • nitrofurantoin (MACROBID) 100 mg capsule Take 1 capsule (100 mg total) by mouth 2 (two) times a day for 5 days 10 capsule 0   • Prenatal Vit-Fe Fumarate-FA (Prenatal Plus Vitamin/Mineral) 27-1 MG TABS Take 1 tablet by mouth in the morning 90 tablet 4     No current facility-administered medications on file prior to visit.   [3]   Past Medical History:  Diagnosis Date   • Abnormal Pap smear of cervix     2019 HSIL pap, colpo CIN3; LEEP " 6/2020; 6/2021 NILM pap/neg HPV   • Chlamydia 2016   • Depression    • Fibromyalgia    [4]   Past Surgical History:  Procedure Laterality Date   • MI COLPOSCOPY CERVIX VAG LOOP ELTRD BX CERVIX N/A 6/12/2020    Procedure: BIOPSY LEEP CERVIX;  Surgeon: Jeremias Allison MD;  Location: [facility];  Service: Gynecology   • WISDOM TOOTH EXTRACTION     [5]   Current Outpatient Medications:   •  nitrofurantoin (MACROBID) 100 mg capsule, Take 1 capsule (100 mg total) by mouth 2 (two) times a day for 5 days, Disp: 10 capsule, Rfl: 0  •  metroNIDAZOLE (FLAGYL) 500 mg tablet, Take 1 tablet (500 mg total) by mouth in the morning and 1 tablet (500 mg total) before bedtime. Do all this for 7 days., Disp: 14 tablet, Rfl: 0  •  Prenatal Vit-Fe Fumarate-FA (Prenatal Plus Vitamin/Mineral) 27-1 MG TABS, Take 1 tablet by mouth in the morning, Disp: 90 tablet, Rfl: 4

## 2025-06-18 PROBLEM — O09.299 HISTORY OF INTRAUTERINE GROWTH RESTRICTION IN PRIOR PREGNANCY, CURRENTLY PREGNANT: Status: ACTIVE | Noted: 2025-06-17

## 2025-06-18 PROBLEM — O09.899 HISTORY OF PRETERM DELIVERY, CURRENTLY PREGNANT: Status: ACTIVE | Noted: 2025-06-16

## 2025-06-18 PROBLEM — O09.529 AMA (ADVANCED MATERNAL AGE) MULTIGRAVIDA 35+: Status: ACTIVE | Noted: 2025-06-16

## 2025-06-20 ENCOUNTER — DOCUMENTATION (OUTPATIENT)
Dept: PERINATAL CARE | Facility: OTHER | Age: 35
End: 2025-06-20

## 2025-06-20 NOTE — PROGRESS NOTES
Our office received notification from [employer] that an order was received for MaterniT 21 on 6/17/25 for Araceli Brar. However, no specimen has been received. I confirmed that the specimen was picked up from our office within the [facility] lab by the [employer]  and receipt was initialed. I called [facility] and spoke to Kenzie at 1150 today. Per [facility]ize, the specimen was still not received.  I explained the above to her. She will be investigating with the [facility] department that is in charge of  pick ups. She was given my direct phone number and email address to follow up. I agreed to wait until Monday, 6/23/25 to call the patient for a re-draw to see if the specimen can be located.

## 2025-06-23 ENCOUNTER — DOCUMENTATION (OUTPATIENT)
Dept: PERINATAL CARE | Facility: OTHER | Age: 35
End: 2025-06-23

## 2025-06-23 NOTE — PROGRESS NOTES
I received notification form [facility] rep  that Araceli Brar's MaterniT 21 has not been received. I attempted to call Araceli Brar to notify her of the need for a re-draw. However, there was no answer and I am unable to leave a message due to the mailbox being full.

## 2025-06-24 ENCOUNTER — DOCUMENTATION (OUTPATIENT)
Age: 35
End: 2025-06-24

## 2025-06-24 NOTE — PROGRESS NOTES
[facility] message sent to Audrey on 6/23/25 regarding the need for a re-draw of her MaterniT 21 specimen. It appears that she has still not read the message.

## 2025-06-27 ENCOUNTER — TELEPHONE (OUTPATIENT)
Dept: PERINATAL CARE | Facility: OTHER | Age: 35
End: 2025-06-27

## 2025-06-27 NOTE — TELEPHONE ENCOUNTER
I once again attempted to neha;leonardo Brar on the phone number listed on her file ([phone]). There was no answer and a message could not be left due to the voice mail not being set up. I was attempting to notify her that she will need a re-draw for her NIPT and was going to offer her a nurse visit appointment.

## 2025-07-07 NOTE — PATIENT INSTRUCTIONS
Thank you for choosing us for your  care today.  If you have any questions about your ultrasound or care, please do not hesitate to contact us or your primary obstetrician.        Some general instructions for your pregnancy are:    Exercise: Aim for 150 minutes per week of regular exercise.  Walking is great!  Nutrition: Choose healthy sources of calcium, iron, and protein.  Avoid ultraprocessed foods and added sugar.  Learn about Preeclampsia: preeclampsia is a common, potentially serious high blood pressure complication in pregnancy.  A blood pressure of 140mmHg (systolic or top number) or 90mmHg (diastolic or bottom number) should be evaluated by your doctor.  Aspirin is sometimes prescribed in early pregnancy to prevent preeclampsia in women with risk factors - ask your obstetrician if you should be on this medication.  For more resources, visit:  https://www.highriskpregnancyinfo.org/preeclampsia  If you smoke, please try to quit completely but also try to reduce your smoking by as much as possible (as soon as possible).  Do not vape.  Please also avoid cannabis products.  Other warning signs to watch out for in pregnancy or postpartum: chest pain, obstructed breathing or shortness of breath, seizures, thoughts of hurting yourself or your baby, bleeding, a painful or swollen leg, fever, or headache (see [facility] Warning Sign[facility]).  If these happen call 911.  Itching is also not normal in pregnancy and if you experience this, especially over your hands and feet, potentially worse at night, notify your doctors.     Thank you for choosing us for your  care today.  If you have any questions about your ultrasound or care, please do not hesitate to contact us or your primary obstetrician.        Some general instructions for your pregnancy are:    Exercise: Aim for 150 minutes per week of regular exercise.  Walking is great!  Nutrition: Choose healthy sources of calcium, iron, and  protein.  Avoid ultraprocessed foods and added sugar.  Learn about Preeclampsia: preeclampsia is a common, potentially serious high blood pressure complication in pregnancy.  A blood pressure of 140mmHg (systolic or top number) or 90mmHg (diastolic or bottom number) should be evaluated by your doctor.  Aspirin is sometimes prescribed in early pregnancy to prevent preeclampsia in women with risk factors - ask your obstetrician if you should be on this medication.  For more resources, visit:  https://www.highriskpregnancyinfo.org/preeclampsia  If you smoke, please try to quit completely but also try to reduce your smoking by as much as possible (as soon as possible).  Do not vape.  Please also avoid cannabis products.  Other warning signs to watch out for in pregnancy or postpartum: chest pain, obstructed breathing or shortness of breath, seizures, thoughts of hurting yourself or your baby, bleeding, a painful or swollen leg, fever, or headache (see [facility] Warning Signs campaign).  If these happen call 911.  Itching is also not normal in pregnancy and if you experience this, especially over your hands and feet, potentially worse at night, notify your doctors.

## 2025-07-08 ENCOUNTER — TELEPHONE (OUTPATIENT)
Dept: PERINATAL CARE | Facility: OTHER | Age: 35
End: 2025-07-08

## 2025-07-08 NOTE — TELEPHONE ENCOUNTER
Attempted to call patient to schedule a redraw nurse visit, went to , patient does not have a voice mailbox set up. Patient has an appointment on 7/10 @  will redraw specimen then.

## 2025-07-09 ENCOUNTER — TELEPHONE (OUTPATIENT)
Age: 35
End: 2025-07-09

## 2025-07-09 PROBLEM — Z3A.16 16 WEEKS GESTATION OF PREGNANCY: Status: ACTIVE | Noted: 2025-07-09

## 2025-07-09 NOTE — TELEPHONE ENCOUNTER
Ladonna called Maternal Fetal Medicine confirming her appointment for tomorrow for her tv ultrasound and redraw NIPT @ 11:30 AM at the [facility] office.

## 2025-07-10 ENCOUNTER — ROUTINE PRENATAL (OUTPATIENT)
Age: 35
End: 2025-07-10
Attending: OBSTETRICS & GYNECOLOGY
Payer: MEDICARE

## 2025-07-10 ENCOUNTER — ANCILLARY PROCEDURE (OUTPATIENT)
Age: 35
End: 2025-07-10
Attending: NURSE PRACTITIONER
Payer: MEDICARE

## 2025-07-10 VITALS
DIASTOLIC BLOOD PRESSURE: 76 MMHG | SYSTOLIC BLOOD PRESSURE: 104 MMHG | BODY MASS INDEX: 25.55 KG/M2 | HEIGHT: 63 IN | HEART RATE: 81 BPM | WEIGHT: 144.2 LBS

## 2025-07-10 DIAGNOSIS — Z36.0 ENCOUNTER FOR ANTENATAL SCREENING FOR CHROMOSOMAL ANOMALIES: Primary | ICD-10-CM

## 2025-07-10 DIAGNOSIS — Z3A.16 16 WEEKS GESTATION OF PREGNANCY: ICD-10-CM

## 2025-07-10 PROCEDURE — 76817 TRANSVAGINAL US OBSTETRIC: CPT | Performed by: OBSTETRICS & GYNECOLOGY

## 2025-07-10 PROCEDURE — 76815 OB US LIMITED FETUS(S): CPT | Performed by: OBSTETRICS & GYNECOLOGY

## 2025-07-10 NOTE — PROGRESS NOTES
Patient chose to have [facility] [id number] Non-Invasive Prenatal Screen [id number] PLUS w/ SCA, WITH fetal sex.  Patient choose to be billed through insurance.     Patient given brochure and is aware [facility] will contact patient's insurance and coordinate coverage.  Provided [facility] contact information. General inquiries [phone], Cost estimates [phone] and [facility] Billing [phone]. Website [email or web address].     Blood collection tubes labeled with patient identifiers (name, medical record number, and date of birth).     Filled out [facility] order form. Patient chose to have blood drawn in our office at time of visit. NIPS was drawn from right arm with a butterfly needle by ASHLEY Suarez RN. .      If patient chose to have blood work drawn at a [facility] lab we requested patient notify MFM (via phone call or [employer] message) when blood collected so office can follow up on results.       Maternal Fetal Medicine will have results in approximately 5-7 business days and will call patient or notify via [employer].  Patient aware viewing lab result online will reveal fetal sex if ordered.    Patient verbalized understanding of all instructions and no questions at this time.

## 2025-07-10 NOTE — PROGRESS NOTES
[facility]: Araceli Brar was seen today for serial cervical length screening ultrasound. Report with images are contained in the ultrasound report located under Chart Review tab in Epic.   I called with results and answered her questions.  Please call our office at [phone] with questions.  -Kadie Larson MD

## 2025-07-13 PROBLEM — O23.40 UTI (URINARY TRACT INFECTION) DURING PREGNANCY: Status: RESOLVED | Noted: 2025-06-13 | Resolved: 2025-07-13

## 2025-07-16 ENCOUNTER — TELEPHONE (OUTPATIENT)
Dept: OBGYN CLINIC | Facility: CLINIC | Age: 35
End: 2025-07-16

## 2025-07-16 LAB
CFDNA.FET/CFDNA.TOTAL SFR FETUS: NORMAL %
CFDNA.FET/CFDNA.TOTAL SFR FETUS: NORMAL %
CITATION REF LAB TEST: NORMAL
FET 13+18+21+X+Y ANEUP PLAS.CFDNA: NEGATIVE
FET CHR 21 TS PLAS.CFDNA QL: NEGATIVE
FET CHR 21 TS PLAS.CFDNA QL: NEGATIVE
FET MS X RISK WBC.DNA+CFDNA QL: NOT DETECTED
FET SEX PLAS.CFDNA DOSAGE CFDNA: NORMAL
FET TS 13 RISK PLAS.CFDNA QL: NEGATIVE
FET X + Y ANEUP RISK PLAS.CFDNA SEQ-IMP: NOT DETECTED
GA EST FROM CONCEPTION DATE: NORMAL D
GESTATIONAL AGE > 9:: YES
LAB DIRECTOR NAME PROVIDER: NORMAL
LABORATORY COMMENT REPORT: NORMAL
LIMITATIONS OF THE TEST: NORMAL
NEGATIVE PREDICTIVE VALUE: NORMAL
PERFORMANCE CHARACTERISTICS: NORMAL
POSITIVE PREDICTIVE VALUE: NORMAL
REF LAB TEST METHOD: NORMAL
SL AMB NOTE:: NORMAL
TEST PERFORMANCE INFO SPEC: NORMAL

## 2025-07-22 ENCOUNTER — ANCILLARY PROCEDURE (OUTPATIENT)
Age: 35
End: 2025-07-22
Attending: NURSE PRACTITIONER
Payer: MEDICARE

## 2025-07-22 ENCOUNTER — ROUTINE PRENATAL (OUTPATIENT)
Age: 35
End: 2025-07-22
Attending: OBSTETRICS & GYNECOLOGY
Payer: MEDICARE

## 2025-07-22 VITALS
BODY MASS INDEX: 26.01 KG/M2 | SYSTOLIC BLOOD PRESSURE: 102 MMHG | WEIGHT: 146.8 LBS | DIASTOLIC BLOOD PRESSURE: 68 MMHG | HEART RATE: 77 BPM | HEIGHT: 63 IN

## 2025-07-22 DIAGNOSIS — Z3A.18 18 WEEKS GESTATION OF PREGNANCY: Primary | ICD-10-CM

## 2025-07-22 DIAGNOSIS — Z3A.18 18 WEEKS GESTATION OF PREGNANCY: ICD-10-CM

## 2025-07-22 PROCEDURE — 76817 TRANSVAGINAL US OBSTETRIC: CPT | Performed by: OBSTETRICS & GYNECOLOGY

## 2025-07-22 PROCEDURE — 76815 OB US LIMITED FETUS(S): CPT | Performed by: OBSTETRICS & GYNECOLOGY

## 2025-07-22 PROCEDURE — NC001 PR NO CHARGE: Performed by: OBSTETRICS & GYNECOLOGY

## 2025-07-22 NOTE — PROGRESS NOTES
[facility]: Araceli Brar was seen today for serial cervical length screening ultrasound. Report with images are contained in the ultrasound report located under Chart Review tab in Epic.   Please call our office at [phone] with questions.  -Kadie Larson MD

## 2025-07-23 PROBLEM — Z3A.18 18 WEEKS GESTATION OF PREGNANCY: Status: ACTIVE | Noted: 2025-07-09

## 2025-07-24 ENCOUNTER — TELEPHONE (OUTPATIENT)
Dept: OBGYN CLINIC | Facility: CLINIC | Age: 35
End: 2025-07-24

## 2025-07-24 NOTE — TELEPHONE ENCOUNTER
Voicemail:hi this is adrianehamzah renae calling my number is [phone] i was calling to make another excuse me make another appointment today add an appointment at 9:00 or 9:15 umm i went away on vacation i won't be back until monday so can you please give me a call back so i can set it up thank you    Pt was called an appt has been rescheduled.

## 2025-07-30 PROBLEM — Z3A.19 19 WEEKS GESTATION OF PREGNANCY: Status: ACTIVE | Noted: 2025-07-09

## 2025-07-31 ENCOUNTER — ROUTINE PRENATAL (OUTPATIENT)
Dept: OBGYN CLINIC | Facility: CLINIC | Age: 35
End: 2025-07-31

## 2025-07-31 VITALS — BODY MASS INDEX: 25.86 KG/M2 | DIASTOLIC BLOOD PRESSURE: 60 MMHG | WEIGHT: 146 LBS | SYSTOLIC BLOOD PRESSURE: 100 MMHG

## 2025-07-31 DIAGNOSIS — O09.299 HISTORY OF INTRAUTERINE GROWTH RESTRICTION IN PRIOR PREGNANCY, CURRENTLY PREGNANT: ICD-10-CM

## 2025-07-31 DIAGNOSIS — Z3A.19 19 WEEKS GESTATION OF PREGNANCY: Primary | ICD-10-CM

## 2025-07-31 DIAGNOSIS — Z34.82 PRENATAL CARE, SUBSEQUENT PREGNANCY IN SECOND TRIMESTER: ICD-10-CM

## 2025-07-31 DIAGNOSIS — O09.899 HISTORY OF PRETERM DELIVERY, CURRENTLY PREGNANT: ICD-10-CM

## 2025-07-31 PROCEDURE — 99213 OFFICE O/P EST LOW 20 MIN: CPT | Performed by: OBSTETRICS & GYNECOLOGY

## 2025-08-06 ENCOUNTER — TELEPHONE (OUTPATIENT)
Dept: OBGYN CLINIC | Facility: CLINIC | Age: 35
End: 2025-08-06

## 2025-08-06 ENCOUNTER — ROUTINE PRENATAL (OUTPATIENT)
Age: 35
End: 2025-08-06
Payer: MEDICARE

## 2025-08-06 ENCOUNTER — ANCILLARY PROCEDURE (OUTPATIENT)
Age: 35
End: 2025-08-06
Attending: NURSE PRACTITIONER
Payer: MEDICARE

## 2025-08-06 VITALS
BODY MASS INDEX: 25.66 KG/M2 | SYSTOLIC BLOOD PRESSURE: 102 MMHG | WEIGHT: 144.8 LBS | HEART RATE: 96 BPM | DIASTOLIC BLOOD PRESSURE: 70 MMHG | HEIGHT: 63 IN

## 2025-08-06 DIAGNOSIS — O09.899 HISTORY OF PRETERM DELIVERY, CURRENTLY PREGNANT: ICD-10-CM

## 2025-08-06 DIAGNOSIS — Z3A.20 20 WEEKS GESTATION OF PREGNANCY: Primary | ICD-10-CM

## 2025-08-06 DIAGNOSIS — Z33.1 NORMAL PREGNANCY, INCIDENTAL: ICD-10-CM

## 2025-08-06 PROCEDURE — 76811 OB US DETAILED SNGL FETUS: CPT | Performed by: OBSTETRICS & GYNECOLOGY

## 2025-08-06 PROCEDURE — NC001 PR NO CHARGE: Performed by: OBSTETRICS & GYNECOLOGY

## 2025-08-06 PROCEDURE — 99213 OFFICE O/P EST LOW 20 MIN: CPT | Performed by: OBSTETRICS & GYNECOLOGY

## 2025-08-06 PROCEDURE — 76817 TRANSVAGINAL US OBSTETRIC: CPT | Performed by: OBSTETRICS & GYNECOLOGY

## 2025-08-19 ENCOUNTER — ANCILLARY PROCEDURE (OUTPATIENT)
Age: 35
End: 2025-08-19
Attending: NURSE PRACTITIONER
Payer: MEDICARE

## 2025-08-19 ENCOUNTER — TELEPHONE (OUTPATIENT)
Dept: OBGYN CLINIC | Facility: CLINIC | Age: 35
End: 2025-08-19

## 2025-08-19 ENCOUNTER — ROUTINE PRENATAL (OUTPATIENT)
Age: 35
End: 2025-08-19
Attending: NURSE PRACTITIONER
Payer: MEDICARE

## 2025-08-19 VITALS
WEIGHT: 147.4 LBS | HEIGHT: 63 IN | BODY MASS INDEX: 26.12 KG/M2 | SYSTOLIC BLOOD PRESSURE: 100 MMHG | DIASTOLIC BLOOD PRESSURE: 68 MMHG | HEART RATE: 75 BPM

## 2025-08-19 DIAGNOSIS — O09.899 HISTORY OF PRETERM DELIVERY, CURRENTLY PREGNANT: ICD-10-CM

## 2025-08-19 DIAGNOSIS — Z3A.22 22 WEEKS GESTATION OF PREGNANCY: Primary | ICD-10-CM

## 2025-08-19 DIAGNOSIS — Z3A.23 23 WEEKS GESTATION OF PREGNANCY: ICD-10-CM

## 2025-08-19 DIAGNOSIS — O99.332 MATERNAL TOBACCO USE IN SECOND TRIMESTER: ICD-10-CM

## 2025-08-19 PROCEDURE — 76815 OB US LIMITED FETUS(S): CPT | Performed by: OBSTETRICS & GYNECOLOGY

## 2025-08-19 PROCEDURE — 76816 OB US FOLLOW-UP PER FETUS: CPT | Performed by: OBSTETRICS & GYNECOLOGY

## 2025-08-19 PROCEDURE — 76817 TRANSVAGINAL US OBSTETRIC: CPT | Performed by: OBSTETRICS & GYNECOLOGY

## 2025-08-20 ENCOUNTER — TELEPHONE (OUTPATIENT)
Dept: OBGYN CLINIC | Facility: CLINIC | Age: 35
End: 2025-08-20

## 2025-08-21 ENCOUNTER — TELEPHONE (OUTPATIENT)
Dept: OBGYN CLINIC | Facility: CLINIC | Age: 35
End: 2025-08-21

## (undated) DEVICE — PAD GROUNDING ADULT

## (undated) DEVICE — PREMIUM DRY TRAY LF: Brand: MEDLINE INDUSTRIES, INC.

## (undated) DEVICE — MEDI-VAC TUBING CONNECTOR 6-IN-1 STRAIGHT: Brand: CARDINAL HEALTH

## (undated) DEVICE — GLOVE PI ULTRA TOUCH SZ.7.0

## (undated) DEVICE — STERILE 8 INCH PROCTO SWAB: Brand: CARDINAL HEALTH

## (undated) DEVICE — ELECTRODE BALL E-Z CLEAN 2IN -0015

## (undated) DEVICE — GLOVE INDICATOR PI UNDERGLOVE SZ 7.5 BLUE

## (undated) DEVICE — MEDI-VAC YANKAUER SUCTION HANDLE W/STRAIGHT TIP & CONTROL VENT: Brand: CARDINAL HEALTH

## (undated) DEVICE — SMOKE EVACUATION TUBING WITH 8 IN INTEGRAL WAND AND SPONGE GUARD: Brand: BUFFALO FILTER

## (undated) DEVICE — 3000CC GUARDIAN II: Brand: GUARDIAN

## (undated) DEVICE — Device

## (undated) DEVICE — SUT SILK 2-0 SH 30 IN K833H